# Patient Record
Sex: MALE | Race: WHITE | URBAN - METROPOLITAN AREA
[De-identification: names, ages, dates, MRNs, and addresses within clinical notes are randomized per-mention and may not be internally consistent; named-entity substitution may affect disease eponyms.]

---

## 2023-11-23 ENCOUNTER — HOSPITAL ENCOUNTER (INPATIENT)
Facility: MEDICAL CENTER | Age: 58
LOS: 5 days | DRG: 091 | End: 2023-11-28
Attending: EMERGENCY MEDICINE | Admitting: STUDENT IN AN ORGANIZED HEALTH CARE EDUCATION/TRAINING PROGRAM
Payer: COMMERCIAL

## 2023-11-23 ENCOUNTER — HOSPITAL ENCOUNTER (OUTPATIENT)
Dept: RADIOLOGY | Facility: MEDICAL CENTER | Age: 58
End: 2023-11-23

## 2023-11-23 DIAGNOSIS — G08 CEREBRAL VENOUS THROMBOSIS: Primary | ICD-10-CM

## 2023-11-23 DIAGNOSIS — G08 ACUTE CEREBRAL VENOUS SINUS THROMBOSIS: ICD-10-CM

## 2023-11-23 DIAGNOSIS — R52 PAIN: ICD-10-CM

## 2023-11-23 DIAGNOSIS — G51.0 BELL'S PALSY: ICD-10-CM

## 2023-11-23 LAB
ALBUMIN SERPL BCP-MCNC: 4.1 G/DL (ref 3.2–4.9)
ALBUMIN/GLOB SERPL: 1.2 G/DL
ALP SERPL-CCNC: 85 U/L (ref 30–99)
ALT SERPL-CCNC: 26 U/L (ref 2–50)
ANION GAP SERPL CALC-SCNC: 15 MMOL/L (ref 7–16)
APTT PPP: 67.6 SEC (ref 24.7–36)
AST SERPL-CCNC: 19 U/L (ref 12–45)
BASOPHILS # BLD AUTO: 0.5 % (ref 0–1.8)
BASOPHILS # BLD: 0.05 K/UL (ref 0–0.12)
BILIRUB SERPL-MCNC: 1.2 MG/DL (ref 0.1–1.5)
BUN SERPL-MCNC: 12 MG/DL (ref 8–22)
CALCIUM ALBUM COR SERPL-MCNC: 8.7 MG/DL (ref 8.5–10.5)
CALCIUM SERPL-MCNC: 8.8 MG/DL (ref 8.5–10.5)
CHLORIDE SERPL-SCNC: 98 MMOL/L (ref 96–112)
CO2 SERPL-SCNC: 22 MMOL/L (ref 20–33)
CREAT SERPL-MCNC: 0.8 MG/DL (ref 0.5–1.4)
EOSINOPHIL # BLD AUTO: 0.11 K/UL (ref 0–0.51)
EOSINOPHIL NFR BLD: 1.1 % (ref 0–6.9)
ERYTHROCYTE [DISTWIDTH] IN BLOOD BY AUTOMATED COUNT: 37.3 FL (ref 35.9–50)
GFR SERPLBLD CREATININE-BSD FMLA CKD-EPI: 103 ML/MIN/1.73 M 2
GLOBULIN SER CALC-MCNC: 3.3 G/DL (ref 1.9–3.5)
GLUCOSE SERPL-MCNC: 114 MG/DL (ref 65–99)
HCT VFR BLD AUTO: 49.8 % (ref 42–52)
HGB BLD-MCNC: 18.2 G/DL (ref 14–18)
IMM GRANULOCYTES # BLD AUTO: 0.03 K/UL (ref 0–0.11)
IMM GRANULOCYTES NFR BLD AUTO: 0.3 % (ref 0–0.9)
INR PPP: 1.11 (ref 0.87–1.13)
LYMPHOCYTES # BLD AUTO: 2.05 K/UL (ref 1–4.8)
LYMPHOCYTES NFR BLD: 19.7 % (ref 22–41)
MCH RBC QN AUTO: 31.8 PG (ref 27–33)
MCHC RBC AUTO-ENTMCNC: 36.5 G/DL (ref 32.3–36.5)
MCV RBC AUTO: 87.1 FL (ref 81.4–97.8)
MONOCYTES # BLD AUTO: 1.01 K/UL (ref 0–0.85)
MONOCYTES NFR BLD AUTO: 9.7 % (ref 0–13.4)
NEUTROPHILS # BLD AUTO: 7.16 K/UL (ref 1.82–7.42)
NEUTROPHILS NFR BLD: 68.7 % (ref 44–72)
NRBC # BLD AUTO: 0 K/UL
NRBC BLD-RTO: 0 /100 WBC (ref 0–0.2)
PLATELET # BLD AUTO: 301 K/UL (ref 164–446)
PMV BLD AUTO: 9.1 FL (ref 9–12.9)
POTASSIUM SERPL-SCNC: 3.5 MMOL/L (ref 3.6–5.5)
PROT SERPL-MCNC: 7.4 G/DL (ref 6–8.2)
PROTHROMBIN TIME: 14.4 SEC (ref 12–14.6)
RBC # BLD AUTO: 5.72 M/UL (ref 4.7–6.1)
SODIUM SERPL-SCNC: 135 MMOL/L (ref 135–145)
UFH PPP CHRO-ACNC: 0.36 IU/ML
WBC # BLD AUTO: 10.4 K/UL (ref 4.8–10.8)

## 2023-11-23 PROCEDURE — 80053 COMPREHEN METABOLIC PANEL: CPT

## 2023-11-23 PROCEDURE — 96366 THER/PROPH/DIAG IV INF ADDON: CPT

## 2023-11-23 PROCEDURE — 83036 HEMOGLOBIN GLYCOSYLATED A1C: CPT

## 2023-11-23 PROCEDURE — 85730 THROMBOPLASTIN TIME PARTIAL: CPT

## 2023-11-23 PROCEDURE — 99291 CRITICAL CARE FIRST HOUR: CPT | Mod: GC,AI | Performed by: STUDENT IN AN ORGANIZED HEALTH CARE EDUCATION/TRAINING PROGRAM

## 2023-11-23 PROCEDURE — 85025 COMPLETE CBC W/AUTO DIFF WBC: CPT

## 2023-11-23 PROCEDURE — 99291 CRITICAL CARE FIRST HOUR: CPT

## 2023-11-23 PROCEDURE — 85610 PROTHROMBIN TIME: CPT

## 2023-11-23 PROCEDURE — 80061 LIPID PANEL: CPT

## 2023-11-23 PROCEDURE — 36415 COLL VENOUS BLD VENIPUNCTURE: CPT

## 2023-11-23 PROCEDURE — 99222 1ST HOSP IP/OBS MODERATE 55: CPT | Performed by: STUDENT IN AN ORGANIZED HEALTH CARE EDUCATION/TRAINING PROGRAM

## 2023-11-23 PROCEDURE — 700111 HCHG RX REV CODE 636 W/ 250 OVERRIDE (IP): Performed by: EMERGENCY MEDICINE

## 2023-11-23 PROCEDURE — 83735 ASSAY OF MAGNESIUM: CPT

## 2023-11-23 PROCEDURE — 85520 HEPARIN ASSAY: CPT

## 2023-11-23 PROCEDURE — 96365 THER/PROPH/DIAG IV INF INIT: CPT

## 2023-11-23 PROCEDURE — 770000 HCHG ROOM/CARE - INTERMEDIATE ICU *

## 2023-11-23 RX ORDER — PREDNISONE 20 MG/1
60 TABLET ORAL ONCE
Status: COMPLETED | OUTPATIENT
Start: 2023-11-24 | End: 2023-11-24

## 2023-11-23 RX ORDER — HEPARIN SODIUM 5000 [USP'U]/100ML
0-30 INJECTION, SOLUTION INTRAVENOUS CONTINUOUS
Status: DISCONTINUED | OUTPATIENT
Start: 2023-11-24 | End: 2023-11-24

## 2023-11-23 RX ORDER — HEPARIN SODIUM 1000 [USP'U]/ML
40 INJECTION, SOLUTION INTRAVENOUS; SUBCUTANEOUS PRN
Status: DISCONTINUED | OUTPATIENT
Start: 2023-11-23 | End: 2023-11-24

## 2023-11-23 RX ADMIN — HEPARIN SODIUM 18 UNITS/KG/HR: 5000 INJECTION, SOLUTION INTRAVENOUS at 23:37

## 2023-11-24 ENCOUNTER — APPOINTMENT (OUTPATIENT)
Dept: RADIOLOGY | Facility: MEDICAL CENTER | Age: 58
DRG: 091 | End: 2023-11-24
Attending: STUDENT IN AN ORGANIZED HEALTH CARE EDUCATION/TRAINING PROGRAM
Payer: COMMERCIAL

## 2023-11-24 PROBLEM — E87.6 HYPOKALEMIA: Status: ACTIVE | Noted: 2023-11-24

## 2023-11-24 PROBLEM — R11.2 NAUSEA AND VOMITING: Status: ACTIVE | Noted: 2023-11-24

## 2023-11-24 PROBLEM — G51.0 BELL'S PALSY: Status: ACTIVE | Noted: 2023-11-24

## 2023-11-24 LAB
ALBUMIN SERPL BCP-MCNC: 3.4 G/DL (ref 3.2–4.9)
ALBUMIN/GLOB SERPL: 1.2 G/DL
ALP SERPL-CCNC: 75 U/L (ref 30–99)
ALT SERPL-CCNC: 23 U/L (ref 2–50)
ANION GAP SERPL CALC-SCNC: 12 MMOL/L (ref 7–16)
AST SERPL-CCNC: 16 U/L (ref 12–45)
BILIRUB SERPL-MCNC: 1.1 MG/DL (ref 0.1–1.5)
BUN SERPL-MCNC: 10 MG/DL (ref 8–22)
CALCIUM ALBUM COR SERPL-MCNC: 8.9 MG/DL (ref 8.5–10.5)
CALCIUM SERPL-MCNC: 8.4 MG/DL (ref 8.5–10.5)
CHLORIDE SERPL-SCNC: 96 MMOL/L (ref 96–112)
CHOLEST SERPL-MCNC: 156 MG/DL (ref 100–199)
CO2 SERPL-SCNC: 25 MMOL/L (ref 20–33)
CREAT SERPL-MCNC: 0.67 MG/DL (ref 0.5–1.4)
ERYTHROCYTE [DISTWIDTH] IN BLOOD BY AUTOMATED COUNT: 37.4 FL (ref 35.9–50)
EST. AVERAGE GLUCOSE BLD GHB EST-MCNC: 97 MG/DL
GFR SERPLBLD CREATININE-BSD FMLA CKD-EPI: 109 ML/MIN/1.73 M 2
GLOBULIN SER CALC-MCNC: 2.9 G/DL (ref 1.9–3.5)
GLUCOSE SERPL-MCNC: 133 MG/DL (ref 65–99)
HBA1C MFR BLD: 5 % (ref 4–5.6)
HCT VFR BLD AUTO: 43.9 % (ref 42–52)
HDLC SERPL-MCNC: 42 MG/DL
HGB BLD-MCNC: 16 G/DL (ref 14–18)
LDLC SERPL CALC-MCNC: 102 MG/DL
MAGNESIUM SERPL-MCNC: 2.3 MG/DL (ref 1.5–2.5)
MCH RBC QN AUTO: 32.3 PG (ref 27–33)
MCHC RBC AUTO-ENTMCNC: 36.4 G/DL (ref 32.3–36.5)
MCV RBC AUTO: 88.7 FL (ref 81.4–97.8)
PLATELET # BLD AUTO: 278 K/UL (ref 164–446)
PMV BLD AUTO: 9.4 FL (ref 9–12.9)
POTASSIUM SERPL-SCNC: 3.9 MMOL/L (ref 3.6–5.5)
PROT SERPL-MCNC: 6.3 G/DL (ref 6–8.2)
RBC # BLD AUTO: 4.95 M/UL (ref 4.7–6.1)
SODIUM SERPL-SCNC: 133 MMOL/L (ref 135–145)
TRIGL SERPL-MCNC: 59 MG/DL (ref 0–149)
UFH PPP CHRO-ACNC: 0.42 IU/ML
UFH PPP CHRO-ACNC: 0.43 IU/ML
UFH PPP CHRO-ACNC: >1.1 IU/ML
WBC # BLD AUTO: 8.4 K/UL (ref 4.8–10.8)

## 2023-11-24 PROCEDURE — 97535 SELF CARE MNGMENT TRAINING: CPT

## 2023-11-24 PROCEDURE — 80053 COMPREHEN METABOLIC PANEL: CPT

## 2023-11-24 PROCEDURE — 99233 SBSQ HOSP IP/OBS HIGH 50: CPT | Performed by: HOSPITALIST

## 2023-11-24 PROCEDURE — 770000 HCHG ROOM/CARE - INTERMEDIATE ICU *

## 2023-11-24 PROCEDURE — 70544 MR ANGIOGRAPHY HEAD W/O DYE: CPT

## 2023-11-24 PROCEDURE — 99232 SBSQ HOSP IP/OBS MODERATE 35: CPT | Performed by: STUDENT IN AN ORGANIZED HEALTH CARE EDUCATION/TRAINING PROGRAM

## 2023-11-24 PROCEDURE — 700111 HCHG RX REV CODE 636 W/ 250 OVERRIDE (IP): Mod: JZ | Performed by: STUDENT IN AN ORGANIZED HEALTH CARE EDUCATION/TRAINING PROGRAM

## 2023-11-24 PROCEDURE — 700111 HCHG RX REV CODE 636 W/ 250 OVERRIDE (IP): Performed by: HOSPITALIST

## 2023-11-24 PROCEDURE — 70553 MRI BRAIN STEM W/O & W/DYE: CPT

## 2023-11-24 PROCEDURE — 700117 HCHG RX CONTRAST REV CODE 255: Performed by: STUDENT IN AN ORGANIZED HEALTH CARE EDUCATION/TRAINING PROGRAM

## 2023-11-24 PROCEDURE — A9270 NON-COVERED ITEM OR SERVICE: HCPCS | Mod: JZ | Performed by: HOSPITALIST

## 2023-11-24 PROCEDURE — 700111 HCHG RX REV CODE 636 W/ 250 OVERRIDE (IP): Performed by: INTERNAL MEDICINE

## 2023-11-24 PROCEDURE — A9270 NON-COVERED ITEM OR SERVICE: HCPCS | Mod: JZ | Performed by: STUDENT IN AN ORGANIZED HEALTH CARE EDUCATION/TRAINING PROGRAM

## 2023-11-24 PROCEDURE — A9579 GAD-BASE MR CONTRAST NOS,1ML: HCPCS | Performed by: STUDENT IN AN ORGANIZED HEALTH CARE EDUCATION/TRAINING PROGRAM

## 2023-11-24 PROCEDURE — 700102 HCHG RX REV CODE 250 W/ 637 OVERRIDE(OP): Mod: JZ | Performed by: STUDENT IN AN ORGANIZED HEALTH CARE EDUCATION/TRAINING PROGRAM

## 2023-11-24 PROCEDURE — 85520 HEPARIN ASSAY: CPT

## 2023-11-24 PROCEDURE — 700111 HCHG RX REV CODE 636 W/ 250 OVERRIDE (IP): Performed by: EMERGENCY MEDICINE

## 2023-11-24 PROCEDURE — 85027 COMPLETE CBC AUTOMATED: CPT

## 2023-11-24 PROCEDURE — 700105 HCHG RX REV CODE 258: Performed by: STUDENT IN AN ORGANIZED HEALTH CARE EDUCATION/TRAINING PROGRAM

## 2023-11-24 PROCEDURE — 700105 HCHG RX REV CODE 258: Performed by: HOSPITALIST

## 2023-11-24 PROCEDURE — 700102 HCHG RX REV CODE 250 W/ 637 OVERRIDE(OP): Mod: JZ | Performed by: HOSPITALIST

## 2023-11-24 RX ORDER — MORPHINE SULFATE 4 MG/ML
4 INJECTION INTRAVENOUS EVERY 4 HOURS PRN
Status: DISCONTINUED | OUTPATIENT
Start: 2023-11-24 | End: 2023-11-28 | Stop reason: HOSPADM

## 2023-11-24 RX ORDER — HEPARIN SODIUM 5000 [USP'U]/100ML
0-30 INJECTION, SOLUTION INTRAVENOUS CONTINUOUS
Status: DISCONTINUED | OUTPATIENT
Start: 2023-11-24 | End: 2023-11-24

## 2023-11-24 RX ORDER — LABETALOL HYDROCHLORIDE 5 MG/ML
10 INJECTION, SOLUTION INTRAVENOUS
Status: COMPLETED | OUTPATIENT
Start: 2023-11-24 | End: 2023-11-24

## 2023-11-24 RX ORDER — HEPARIN SODIUM 1000 [USP'U]/ML
40 INJECTION, SOLUTION INTRAVENOUS; SUBCUTANEOUS PRN
Status: DISCONTINUED | OUTPATIENT
Start: 2023-11-24 | End: 2023-11-24

## 2023-11-24 RX ORDER — AMLODIPINE BESYLATE 10 MG/1
5 TABLET ORAL
Status: DISCONTINUED | OUTPATIENT
Start: 2023-11-24 | End: 2023-11-25

## 2023-11-24 RX ORDER — LABETALOL HYDROCHLORIDE 5 MG/ML
10 INJECTION, SOLUTION INTRAVENOUS
Status: DISCONTINUED | OUTPATIENT
Start: 2023-11-24 | End: 2023-11-28 | Stop reason: HOSPADM

## 2023-11-24 RX ORDER — ACETAMINOPHEN 325 MG/1
650 TABLET ORAL EVERY 6 HOURS PRN
Status: DISCONTINUED | OUTPATIENT
Start: 2023-11-24 | End: 2023-11-28 | Stop reason: HOSPADM

## 2023-11-24 RX ORDER — POTASSIUM CHLORIDE 20 MEQ/1
20 TABLET, EXTENDED RELEASE ORAL ONCE
Status: COMPLETED | OUTPATIENT
Start: 2023-11-24 | End: 2023-11-24

## 2023-11-24 RX ORDER — OXYCODONE HYDROCHLORIDE 5 MG/1
5 TABLET ORAL EVERY 4 HOURS PRN
Status: DISCONTINUED | OUTPATIENT
Start: 2023-11-24 | End: 2023-11-28 | Stop reason: HOSPADM

## 2023-11-24 RX ORDER — HEPARIN SODIUM 5000 [USP'U]/100ML
0-30 INJECTION, SOLUTION INTRAVENOUS CONTINUOUS
Status: DISCONTINUED | OUTPATIENT
Start: 2023-11-24 | End: 2023-11-27

## 2023-11-24 RX ORDER — LABETALOL HYDROCHLORIDE 5 MG/ML
10 INJECTION, SOLUTION INTRAVENOUS
Status: DISCONTINUED | OUTPATIENT
Start: 2023-11-24 | End: 2023-11-24

## 2023-11-24 RX ORDER — ONDANSETRON 2 MG/ML
4 INJECTION INTRAMUSCULAR; INTRAVENOUS EVERY 4 HOURS PRN
Status: DISCONTINUED | OUTPATIENT
Start: 2023-11-24 | End: 2023-11-28 | Stop reason: HOSPADM

## 2023-11-24 RX ORDER — SODIUM CHLORIDE 9 MG/ML
500 INJECTION, SOLUTION INTRAVENOUS
Status: DISCONTINUED | OUTPATIENT
Start: 2023-11-24 | End: 2023-11-28 | Stop reason: HOSPADM

## 2023-11-24 RX ORDER — BISACODYL 10 MG
10 SUPPOSITORY, RECTAL RECTAL
Status: DISCONTINUED | OUTPATIENT
Start: 2023-11-24 | End: 2023-11-28 | Stop reason: HOSPADM

## 2023-11-24 RX ORDER — POTASSIUM CHLORIDE 20 MEQ/1
40 TABLET, EXTENDED RELEASE ORAL ONCE
Status: COMPLETED | OUTPATIENT
Start: 2023-11-24 | End: 2023-11-24

## 2023-11-24 RX ORDER — HYDRALAZINE HYDROCHLORIDE 20 MG/ML
10 INJECTION INTRAMUSCULAR; INTRAVENOUS
Status: DISCONTINUED | OUTPATIENT
Start: 2023-11-24 | End: 2023-11-24

## 2023-11-24 RX ORDER — CARBOXYMETHYLCELLULOSE SODIUM 5 MG/ML
1 SOLUTION/ DROPS OPHTHALMIC 2 TIMES DAILY PRN
Status: DISCONTINUED | OUTPATIENT
Start: 2023-11-24 | End: 2023-11-28 | Stop reason: HOSPADM

## 2023-11-24 RX ORDER — AMOXICILLIN 250 MG
2 CAPSULE ORAL 2 TIMES DAILY
Status: DISCONTINUED | OUTPATIENT
Start: 2023-11-24 | End: 2023-11-28 | Stop reason: HOSPADM

## 2023-11-24 RX ORDER — SODIUM CHLORIDE, SODIUM LACTATE, POTASSIUM CHLORIDE, CALCIUM CHLORIDE 600; 310; 30; 20 MG/100ML; MG/100ML; MG/100ML; MG/100ML
INJECTION, SOLUTION INTRAVENOUS CONTINUOUS
Status: DISCONTINUED | OUTPATIENT
Start: 2023-11-24 | End: 2023-11-24

## 2023-11-24 RX ORDER — HYDRALAZINE HYDROCHLORIDE 20 MG/ML
10 INJECTION INTRAMUSCULAR; INTRAVENOUS
Status: COMPLETED | OUTPATIENT
Start: 2023-11-24 | End: 2023-11-24

## 2023-11-24 RX ORDER — ATORVASTATIN CALCIUM 80 MG/1
80 TABLET, FILM COATED ORAL EVERY EVENING
Status: DISCONTINUED | OUTPATIENT
Start: 2023-11-24 | End: 2023-11-28 | Stop reason: HOSPADM

## 2023-11-24 RX ORDER — HEPARIN SODIUM 1000 [USP'U]/ML
40 INJECTION, SOLUTION INTRAVENOUS; SUBCUTANEOUS PRN
Status: DISCONTINUED | OUTPATIENT
Start: 2023-11-24 | End: 2023-11-27

## 2023-11-24 RX ORDER — CARBOXYMETHYLCELLULOSE SODIUM 5 MG/ML
1 SOLUTION/ DROPS OPHTHALMIC 2 TIMES DAILY
Status: DISCONTINUED | OUTPATIENT
Start: 2023-11-25 | End: 2023-11-27

## 2023-11-24 RX ORDER — POLYETHYLENE GLYCOL 3350 17 G/17G
1 POWDER, FOR SOLUTION ORAL
Status: DISCONTINUED | OUTPATIENT
Start: 2023-11-24 | End: 2023-11-28 | Stop reason: HOSPADM

## 2023-11-24 RX ORDER — VALACYCLOVIR HYDROCHLORIDE 500 MG/1
1000 TABLET, FILM COATED ORAL 3 TIMES DAILY
Status: DISCONTINUED | OUTPATIENT
Start: 2023-11-24 | End: 2023-11-28 | Stop reason: HOSPADM

## 2023-11-24 RX ORDER — LORAZEPAM 2 MG/ML
0.5 INJECTION INTRAMUSCULAR ONCE
Status: COMPLETED | OUTPATIENT
Start: 2023-11-24 | End: 2023-11-24

## 2023-11-24 RX ADMIN — OXYCODONE 5 MG: 5 TABLET ORAL at 18:05

## 2023-11-24 RX ADMIN — SENNOSIDES AND DOCUSATE SODIUM 2 TABLET: 50; 8.6 TABLET ORAL at 05:40

## 2023-11-24 RX ADMIN — MORPHINE SULFATE 4 MG: 4 INJECTION, SOLUTION INTRAMUSCULAR; INTRAVENOUS at 06:06

## 2023-11-24 RX ADMIN — SENNOSIDES AND DOCUSATE SODIUM 2 TABLET: 50; 8.6 TABLET ORAL at 17:09

## 2023-11-24 RX ADMIN — POTASSIUM CHLORIDE 20 MEQ: 1500 TABLET, EXTENDED RELEASE ORAL at 13:27

## 2023-11-24 RX ADMIN — POTASSIUM CHLORIDE 40 MEQ: 1500 TABLET, EXTENDED RELEASE ORAL at 01:53

## 2023-11-24 RX ADMIN — SODIUM CHLORIDE, POTASSIUM CHLORIDE, SODIUM LACTATE AND CALCIUM CHLORIDE: 600; 310; 30; 20 INJECTION, SOLUTION INTRAVENOUS at 01:46

## 2023-11-24 RX ADMIN — SODIUM CHLORIDE, POTASSIUM CHLORIDE, SODIUM LACTATE AND CALCIUM CHLORIDE: 600; 310; 30; 20 INJECTION, SOLUTION INTRAVENOUS at 14:05

## 2023-11-24 RX ADMIN — VALACYCLOVIR HYDROCHLORIDE 1000 MG: 500 TABLET, FILM COATED ORAL at 17:09

## 2023-11-24 RX ADMIN — LABETALOL HYDROCHLORIDE 10 MG: 5 INJECTION, SOLUTION INTRAVENOUS at 02:14

## 2023-11-24 RX ADMIN — AMLODIPINE BESYLATE 5 MG: 5 TABLET ORAL at 15:34

## 2023-11-24 RX ADMIN — ACETAMINOPHEN 650 MG: 325 TABLET, FILM COATED ORAL at 17:09

## 2023-11-24 RX ADMIN — HYDRALAZINE HYDROCHLORIDE 10 MG: 20 INJECTION, SOLUTION INTRAMUSCULAR; INTRAVENOUS at 17:13

## 2023-11-24 RX ADMIN — HEPARIN SODIUM 15 UNITS/KG/HR: 5000 INJECTION, SOLUTION INTRAVENOUS at 13:23

## 2023-11-24 RX ADMIN — LORAZEPAM 0.5 MG: 2 INJECTION, SOLUTION INTRAMUSCULAR; INTRAVENOUS at 11:59

## 2023-11-24 RX ADMIN — PREDNISONE 60 MG: 50 TABLET ORAL at 13:28

## 2023-11-24 RX ADMIN — OXYCODONE 5 MG: 5 TABLET ORAL at 14:13

## 2023-11-24 RX ADMIN — CARBOXYMETHYLCELLULOSE SODIUM 1 DROP: 5 SOLUTION/ DROPS OPHTHALMIC at 02:14

## 2023-11-24 RX ADMIN — SODIUM CHLORIDE, POTASSIUM CHLORIDE, SODIUM LACTATE AND CALCIUM CHLORIDE: 600; 310; 30; 20 INJECTION, SOLUTION INTRAVENOUS at 13:20

## 2023-11-24 RX ADMIN — LABETALOL HYDROCHLORIDE 10 MG: 5 INJECTION, SOLUTION INTRAVENOUS at 03:17

## 2023-11-24 RX ADMIN — PREDNISONE 60 MG: 20 TABLET ORAL at 00:14

## 2023-11-24 RX ADMIN — MINERAL OIL, PETROLATUM 1 APPLICATION: 425; 568 OINTMENT OPHTHALMIC at 21:15

## 2023-11-24 RX ADMIN — MORPHINE SULFATE 4 MG: 4 INJECTION, SOLUTION INTRAMUSCULAR; INTRAVENOUS at 09:37

## 2023-11-24 RX ADMIN — VALACYCLOVIR HYDROCHLORIDE 1000 MG: 500 TABLET, FILM COATED ORAL at 03:09

## 2023-11-24 RX ADMIN — VALACYCLOVIR HYDROCHLORIDE 1000 MG: 500 TABLET, FILM COATED ORAL at 13:28

## 2023-11-24 RX ADMIN — ACETAMINOPHEN 650 MG: 325 TABLET, FILM COATED ORAL at 02:24

## 2023-11-24 RX ADMIN — GADOTERIDOL 15 ML: 279.3 INJECTION, SOLUTION INTRAVENOUS at 13:00

## 2023-11-24 RX ADMIN — ACETAMINOPHEN 650 MG: 325 TABLET, FILM COATED ORAL at 08:39

## 2023-11-24 RX ADMIN — POLYETHYLENE GLYCOL 3350 1 PACKET: 17 POWDER, FOR SOLUTION ORAL at 17:09

## 2023-11-24 RX ADMIN — LABETALOL HYDROCHLORIDE 10 MG: 5 INJECTION, SOLUTION INTRAVENOUS at 20:44

## 2023-11-24 RX ADMIN — ACETAMINOPHEN 650 MG: 325 TABLET, FILM COATED ORAL at 23:13

## 2023-11-24 RX ADMIN — LABETALOL HYDROCHLORIDE 10 MG: 5 INJECTION, SOLUTION INTRAVENOUS at 23:32

## 2023-11-24 RX ADMIN — HYDRALAZINE HYDROCHLORIDE 10 MG: 20 INJECTION, SOLUTION INTRAMUSCULAR; INTRAVENOUS at 08:40

## 2023-11-24 RX ADMIN — ATORVASTATIN CALCIUM 80 MG: 80 TABLET, FILM COATED ORAL at 17:09

## 2023-11-24 RX ADMIN — HEPARIN SODIUM 15 UNITS/KG/HR: 5000 INJECTION, SOLUTION INTRAVENOUS at 18:41

## 2023-11-24 ASSESSMENT — FIBROSIS 4 INDEX
FIB4 SCORE: 0.69

## 2023-11-24 ASSESSMENT — PAIN DESCRIPTION - PAIN TYPE
TYPE: ACUTE PAIN

## 2023-11-24 ASSESSMENT — ENCOUNTER SYMPTOMS
FEVER: 0
HEADACHES: 1
BLURRED VISION: 0
FOCAL WEAKNESS: 0

## 2023-11-24 ASSESSMENT — ACTIVITIES OF DAILY LIVING (ADL): TOILETING: INDEPENDENT

## 2023-11-24 ASSESSMENT — COPD QUESTIONNAIRES
COPD SCREENING SCORE: 1
DURING THE PAST 4 WEEKS HOW MUCH DID YOU FEEL SHORT OF BREATH: NONE/LITTLE OF THE TIME
DO YOU EVER COUGH UP ANY MUCUS OR PHLEGM?: NO/ONLY WITH OCCASIONAL COLDS OR INFECTIONS
HAVE YOU SMOKED AT LEAST 100 CIGARETTES IN YOUR ENTIRE LIFE: NO/DON'T KNOW

## 2023-11-24 ASSESSMENT — COGNITIVE AND FUNCTIONAL STATUS - GENERAL
DAILY ACTIVITIY SCORE: 24
MOBILITY SCORE: 24
SUGGESTED CMS G CODE MODIFIER DAILY ACTIVITY: CH
SUGGESTED CMS G CODE MODIFIER DAILY ACTIVITY: CH
DAILY ACTIVITIY SCORE: 24
SUGGESTED CMS G CODE MODIFIER MOBILITY: CH
SUGGESTED CMS G CODE MODIFIER MOBILITY: CH
MOBILITY SCORE: 24

## 2023-11-24 ASSESSMENT — PATIENT HEALTH QUESTIONNAIRE - PHQ9
1. LITTLE INTEREST OR PLEASURE IN DOING THINGS: NOT AT ALL
2. FEELING DOWN, DEPRESSED, IRRITABLE, OR HOPELESS: NOT AT ALL
SUM OF ALL RESPONSES TO PHQ9 QUESTIONS 1 AND 2: 0

## 2023-11-24 NOTE — CONSULTS
RCC    Asked by ERP to evaluate for IMCU admission    58-year-old male with a history of dyslipidemia transferred from the [facility] for extensive dural venous sinus thrombosis noted on CTA.  Patient seen by neurology and patient felt appropriate for IV heparin therapy with every 2 hour neurochecks.  Neurology okay with IMCU admission with patient with low NIH score.  Tight blood pressure goal 100-1 40 range.  Follow-up CT for neuro changes.  MRI brain with contrast venogram in a.m.  Patient also noted to have Bell's palsy and acyclovir being started.  Neurology considering steroids as well.  No significant hemodynamic or respiratory issues at this time.    This patient will be admitted to the IMCU for dural venous sinus thrombosis and remains under the care of neurology and hospitalist team (who all questions and concerns should be relayed to).  While a critical care consultation has not been requested, we are immediately available if the patient requires critical care in the future.

## 2023-11-24 NOTE — HOSPITAL COURSE
58-year-old male with history of dyslipidemia presented on 11/23/2023 with 1 week history of headache for which she was evaluated at outside hospital diagnosed with a viral syndrome and discharged.  The patient returned with worsening headache associated with nausea vomiting and right-sided facial droop.  CTA demonstrated extensive dural venous sinus thrombosis and the patient was transferred to our facility for higher level of care.

## 2023-11-24 NOTE — ASSESSMENT & PLAN NOTE
Patient with 1 week history of headaches, worsening with nausea and vomiting, CTA at outside hospital demonstrating cerebral venous thrombosis,     Neurology following  Transition heparin drip to Eliquis 5 twice daily for 6-month  Neurochecks every 4 hours  Continue close clinical monitoring  Reviewed with patient need for outpatient   ollow-up with neurology and hematology and defer hypercoagulable workup to outpatient setting since he was on heparin drip    Patient reports being up-to-date on routine cancer screening.

## 2023-11-24 NOTE — CONSULTS
Neurology Initial Consult H&P  Neurohospitalist Service, [facility]    Referring Physician: No att. providers found    Chief Complaint   Patient presents with    Possible Stroke     PT is tx from [place], PT noted to have full venous occlusion while at outside facility. PT reports HA x 1 week prior. Only neuro deficit at this time is slight left sided facial droop.        HPI: Khalif Wilson is a 58 y.o. male with history of hyperlipidemia who presented as a transfer from [facility] after finding of  extensive dural venous sinus thrombosis on outside CTA.  Patient reports first developing a headache last week.  It was of mild to moderate intensity and occipitally based.  Subsequently patient developed nausea and vomiting and then went to the hospital for evaluation.  At outside facility patient was treated for a presumed viral etiology for headache nausea and vomiting.  When his symptoms failed to octavio patient returned to hospital for evaluation.  At that time patient received CT head with normal findings and was once more discharged with symptomatic management.  Patient continued to have mild headaches but was using pain medication and antinausea medication to control symptoms.  The day of admission patient had run out of medication and it began to develop worsening headache once more.  At around dinnertime this evening roughly 7:00 patient developed drooling from the right side of his mouth.  Patient then also noticed weakness of eyelid closure on the right.  At this time he wants more presented to [facility] for evaluation.  At which time subsequent imaging demonstrated extensive dural venous sinus thrombosis.  [facility] contacted patient case discussed with ED physician and initiated appropriate medical management measures.  Patient was given a bolus of heparin and began on heparin drip and arrange for transfer to this hospital for  higher level of care.    Review of systems: In addition to what is detailed in the HPI above, all other systems reviewed and are negative.    Past Medical History:    has no past medical history on file.    FHx:  family history is not on file.    SHx:       Allergies:  No Known Allergies    Medications:  No current facility-administered medications for this encounter.  No current outpatient medications on file.      NEUROLOGICAL EXAM:     MENTAL STATUS: Awake, alert and oriented to person, place, time and situation, attention and memory intact  LANG/SPEECH: Naming and repetition intact, fluent speech, follows simple, two-step, multi-step and complex commands.    CRANIAL NERVES:  II: Pupils equal and reactive, no VF deficits  III, IV, VI: EOM intact, no gaze preference or deviation, no nystagmus.  V: normal sensation in V1, V2, and V3 segments bilaterally  VII: Right facial asymmetry including right frontalis weakness right orbicularis oculi weakness subtle nasolabial fold loss and asymmetric smile with right facial droop.  VIII: normal hearing to speech  IX, X: normal palatal elevation, no uvular deviation  XI: 5/5 head turn and 5/5 shoulder shrug bilaterally  XII: midline tongue protrusion    MOTOR: Antigravity movement in bilateral upper and lower extremities. Full and symmetric power in proximal and distal muscle groups in bilateral upper and lower extremities    REFLEXES: 1-2/4 throughout, bilateral flexor plantar response, no clonus  SENSORY: Normal to fine touch in all extremities, no extinction to double sided stimulation (visual & tactile)  COORD: Normal finger to nose, no tremor, no dysmetria  GAIT: Deferred      NIHSS: National Institutes of Health Stroke Scale    [0] 1a:Level of Consciousness    0-alert 1-drowsy   2-stupor   3-coma  [0] 1b:LOC Questions                  0-both  1-one      2-neither  [0] 1c:LOC Commands                   0-both  1-one      2-neither  [0] 2: Best Gaze                     " 0-nl    1-partial  2-forced  [0] 3: Visual Fields                   0-nl    1-partial  2-complete 3-bilat  [2] 4: Facial Paresis                0-nl    1-minor    2-partial  3-full  MOTOR                       0-nl  [0] 5: Right Arm           1-drift  [0] 6: Left Arm             2-some effort vs gravity  [0] 7: Right Leg           3-no effort vs gravity  [0] 8: Left Leg             4-no movement                             x-untestable  [0] 9: Limb Ataxia                    0-abs   1-1_limb   2-2+_limbs       x-untestable  [0] 10:Sensory                        0-nl    1-partial  2-dense  [0] 11:Best Language/Aphasia         0-nl    1-mild/mod 2-severe   3-mute  [0] 12:Dysarthria                     0-nl    1-mild/mod 2-severe       x-untestable  [0] 13:Neglect/Inattention            0-none  1-partial  2-complete  [2] TOTAL      Objective Data:    Labs:  No results found for: \"PROTHROMBTM\", \"INR\"   No results found for: \"WBC\", \"RBC\", \"HEMOGLOBIN\", \"HEMATOCRIT\", \"MCV\", \"MCH\", \"MCHC\", \"MPV\", \"NEUTSPOLYS\", \"LYMPHOCYTES\", \"MONOCYTES\", \"EOSINOPHILS\", \"BASOPHILS\", \"HYPOCHROMIA\", \"ANISOCYTOSIS\"   No results found for: \"SODIUM\", \"POTASSIUM\", \"CHLORIDE\", \"CO2\", \"GLUCOSE\", \"BUN\", \"CREATININE\", \"BUNCREATRAT\", \"GLOMRATE\"   No results found for: \"CHOLSTRLTOT\", \"LDL\", \"HDL\", \"TRIGLYCERIDE\"    No results found for: \"ALKPHOSPHAT\", \"ASTSGOT\", \"ALTSGPT\", \"TBILIRUBIN\"     Imaging/Testing:    I interpreted and/or reviewed the patient's neuroimaging    No orders to display       Assessment and Plan:  Khalif Wilson is a 58 y.o. male with history of hyperlipidemia who presented as a transfer from [facility] after finding of  extensive dural venous sinus thrombosis on outside CTA.  Patient begun on heparin drip and transferred to [facility] for higher level of care.  On physical exam patient demonstrates right lower motor neuron weakness pattern consistent with Bell's palsy.  Based on patient's pattern of " weakness I have low suspicion for an acute ischemic infarct however follow-up MRI will clarify.  It is possible that a viral etiology post explains patient's dural venous sinus thrombosis secondary to acute inflammation and his acute facial nerve weakness.    Problem list:  -- Dural venous sinus thrombosis      Plan:  -- Admit to [facility] w/ q2 neurochecks  -- Systolic blood pressure goal should be normotension, -140  -- Continue heparin gtt DVT protocol  -- STAT CTH for any acute neurologic changes  -- Will decide on appropriate hypercoaguable work up this admission  -- Please obtain MRI brain w and w/o contrast and MRI venogram  -- Patient will be on therapeutic heparin gtt; no need for further mechanical or chemo DVT ppx  -- Start Acyclovir for Bell's palsy, will review relevant literature on safety of steroids in setting of DVST, may decide on steroid dose this admission, eye patch and eye drops recommended to avoid excessive drying of eye w/ potential for injury and scarring  -- PT/OT/SLP    Herman Aquino III, MD  Vascular Neurology, [facility]

## 2023-11-24 NOTE — CARE PLAN
The patient is Watcher - Medium risk of patient condition declining or worsening    Shift Goals  Clinical Goals: blood pressure <140, stable neuro assessment  Patient Goals: rest  Family Goals: updates    Progress made toward(s) clinical / shift goals:    Problem: Pain - Standard  Goal: Alleviation of pain or a reduction in pain to the patient’s comfort goal  Outcome: Progressing     Problem: Neuro Status  Goal: Neuro status will remain stable or improve  Outcome: Progressing       Patient is not progressing towards the following goals: N/A

## 2023-11-24 NOTE — ED TRIAGE NOTES
"Chief Complaint   Patient presents with    Possible Stroke     PT is tx from [place], PT noted to have full venous occlusion while at outside facility. PT reports HA x 1 week prior. Only neuro deficit at this time is slight left sided facial droop.        BIB EMS to red 7, pt on monitor, provided call bell and in gown, labs drawn and sent.    Medications given en route: Heparin drip continued    /84   Pulse 78   Temp 37.1 °C (98.7 °F) (Temporal)   Resp 18   Ht 1.727 m (5' 8\")   Wt 86.2 kg (190 lb)   SpO2 95%   BMI 28.89 kg/m²     "

## 2023-11-24 NOTE — PROGRESS NOTES
Neurology Progress Note  Neurohospitalist Service, [facility]    Referring Physician: Mark Garcia M.D.      Interval History: Patient seen and examined this morning.  Patient doing well without new acute concerns or complaints.  No reported acute overnight events.    Review of systems: In addition to what is detailed in the HPI and/or updated in the interval history, all other systems reviewed and are negative.    Past Medical History, Past Surgical History and Social History reviewed and unchanged from prior    Medications:    Current Facility-Administered Medications:     senna-docusate (Pericolace Or Senokot S) 8.6-50 MG per tablet 2 Tablet, 2 Tablet, Oral, BID, 2 Tablet at 11/24/23 0540 **AND** polyethylene glycol/lytes (Miralax) PACKET 1 Packet, 1 Packet, Oral, QDAY PRN **AND** magnesium hydroxide (Milk Of Magnesia) suspension 30 mL, 30 mL, Oral, QDAY PRN **AND** bisacodyl (Dulcolax) suppository 10 mg, 10 mg, Rectal, QDAY PRN, Yadiel Kumar M.D.    Respiratory Therapy Consult, , Nebulization, Continuous RT, Yadiel Kumar M.D.    acetaminophen (Tylenol) tablet 650 mg, 650 mg, Oral, Q6HRS PRN, Yadiel Kumar M.D., 650 mg at 11/24/23 0839    atorvastatin (Lipitor) tablet 80 mg, 80 mg, Oral, Q EVENING, Yadiel Kumar M.D.    Blood pressure control per admin instructions, , Other, PHARMACY TO DOSE, Yadiel Kumar M.D.    NS (Bolus) 0.9 % infusion 500 mL, 500 mL, Intravenous, Once PRN, Yadiel Kumar M.D.    ondansetron (Zofran) syringe/vial injection 4 mg, 4 mg, Intravenous, Q4HRS PRN, Yadiel Kumar M.D.    hydrALAZINE (Apresoline) injection 10 mg, 10 mg, Intravenous, Q2HRS PRN, Yadiel Kumar M.D., 10 mg at 11/24/23 0840    morphine 4 MG/ML injection 4 mg, 4 mg, Intravenous, Q4HRS PRN, Yadiel Kumar M.D., 4 mg at 11/24/23 0937    carboxymethylcellulose (Refresh Tears) 0.5 % ophthalmic drops 1 Drop, 1 Drop, Both Eyes, BID PRN, Yadiel Kumar M.D., 1  "Drop at 11/24/23 0214    valACYclovir (Valtrex) caplet 1,000 mg, 1,000 mg, Oral, TID, Yadiel Kumar M.D., 1,000 mg at 11/24/23 0309    oxyCODONE immediate-release (Roxicodone) tablet 5 mg, 5 mg, Oral, Q4HRS PRN, Mark Garcia M.D.    LORazepam (Ativan) injection 0.5 mg, 0.5 mg, Intravenous, Once, Mark Garcia M.D.    lactated ringers infusion, , Intravenous, Continuous, Mark Garcia M.D.    heparin infusion 25,000 units in 500 mL 0.45% NACL, 0-30 Units/kg/hr (Order-Specific), Intravenous, Continuous, Mark Garcia M.D.    heparin injection 3,400 Units, 40 Units/kg (Order-Specific), Intravenous, PRN, Mark Garcia M.D.    Physical Examination:   /71   Pulse 66   Temp 36.9 °C (98.5 °F) (Temporal)   Resp (!) 21   Ht 1.727 m (5' 8\")   Wt 83.6 kg (184 lb 4.9 oz)   SpO2 95%   BMI 28.02 kg/m²     NEUROLOGICAL EXAM:     MENTAL STATUS: Awake, alert and oriented to person, place, time and situation, attention and memory intact  LANG/SPEECH: Naming and repetition intact, fluent speech, follows simple, two-step, multi-step and complex commands.     CRANIAL NERVES:  II: Pupils equal and reactive, no VF deficits  III, IV, VI: EOM intact, no gaze preference or deviation, no nystagmus.  V: normal sensation in V1, V2, and V3 segments bilaterally  VII: Right facial asymmetry including right frontalis weakness right orbicularis oculi weakness subtle nasolabial fold loss and asymmetric smile with right facial droop.  VIII: normal hearing to speech  IX, X: normal palatal elevation, no uvular deviation  XI: 5/5 head turn and 5/5 shoulder shrug bilaterally  XII: midline tongue protrusion     MOTOR: Antigravity movement in bilateral upper and lower extremities. Full and symmetric power in proximal and distal muscle groups in bilateral upper and lower extremities     REFLEXES: bilateral flexor plantar response, no clonus  SENSORY: Normal to fine touch in all extremities  COORD: Normal " finger to nose, no tremor, no dysmetria  GAIT: Deferred    Objective Data:    Labs:  Lab Results   Component Value Date/Time    PROTHROMBTM 14.4 11/23/2023 10:41 PM    INR 1.11 11/23/2023 10:41 PM      Lab Results   Component Value Date/Time    WBC 8.4 11/24/2023 05:47 AM    RBC 4.95 11/24/2023 05:47 AM    HEMOGLOBIN 16.0 11/24/2023 05:47 AM    HEMATOCRIT 43.9 11/24/2023 05:47 AM    MCV 88.7 11/24/2023 05:47 AM    MCH 32.3 11/24/2023 05:47 AM    MCHC 36.4 11/24/2023 05:47 AM    MPV 9.4 11/24/2023 05:47 AM    NEUTSPOLYS 68.70 11/23/2023 10:41 PM    LYMPHOCYTES 19.70 (L) 11/23/2023 10:41 PM    MONOCYTES 9.70 11/23/2023 10:41 PM    EOSINOPHILS 1.10 11/23/2023 10:41 PM    BASOPHILS 0.50 11/23/2023 10:41 PM      Lab Results   Component Value Date/Time    SODIUM 133 (L) 11/24/2023 05:47 AM    POTASSIUM 3.9 11/24/2023 05:47 AM    CHLORIDE 96 11/24/2023 05:47 AM    CO2 25 11/24/2023 05:47 AM    GLUCOSE 133 (H) 11/24/2023 05:47 AM    BUN 10 11/24/2023 05:47 AM    CREATININE 0.67 11/24/2023 05:47 AM      Lab Results   Component Value Date/Time    CHOLSTRLTOT 156 11/23/2023 10:41 PM     (H) 11/23/2023 10:41 PM    HDL 42 11/23/2023 10:41 PM    TRIGLYCERIDE 59 11/23/2023 10:41 PM       Lab Results   Component Value Date/Time    ALKPHOSPHAT 75 11/24/2023 05:47 AM    ASTSGOT 16 11/24/2023 05:47 AM    ALTSGPT 23 11/24/2023 05:47 AM    TBILIRUBIN 1.1 11/24/2023 05:47 AM        Imaging/Testing:    I interpreted and/or reviewed the patient's neuroimaging    MR-BRAIN-WITH & W/O    (Results Pending)   MR-VENOGRAM (MRV) HEAD    (Results Pending)       Assessment and Plan:  Khalif Wilson is a 58 y.o. male with history of hyperlipidemia who presented as a transfer from [facility] after finding of  extensive dural venous sinus thrombosis on outside CTA.  Patient begun on heparin drip and transferred to [facility] for higher level of care.  On physical exam patient demonstrates right lower motor  neuron weakness pattern consistent with Bell's palsy.  Based on patient's pattern of weakness I have low suspicion for an acute ischemic infarct however follow-up MRI will clarify.  It is possible that a viral etiology post explains patient's dural venous sinus thrombosis secondary to acute inflammation and his acute facial nerve weakness.  Have started treatment for Bell's palsy.  Pending further imaging will decide on duration of IV anticoagulation and transition to oral anticoagulation treatment.     Problem list:  -- Dural venous sinus thrombosis        Plan:  -- Admit to [facility] w/ q2 neurochecks  -- Systolic blood pressure goal should be normotension, -140  -- Continue heparin gtt DVT protocol  -- STAT CTH for any acute neurologic changes  -- Will decide on appropriate hypercoaguable work up this admission  -- Once transitioned to oral anticoagulation patient will be treated for 3 to 6 months and have follow-up as an outpatient basis for repeat vascular imaging  -- Please obtain MRI brain w and w/o contrast and MRI venogram  -- Patient will be on therapeutic heparin gtt; no need for further mechanical or chemo DVT ppx  -- Valtrex started for Rx of Bell's palsy, would add Prednisone 60 daily x7 days  -- PT/OT/SLP    I have performed a physical exam and reviewed and updated ROS and Plan today (11/24/2023).     Herman Aquino III, MD  Vascular Neurology, [facility]

## 2023-11-24 NOTE — PROGRESS NOTES
Heparin moved from Prince pump to MRI pump. Infusing at 15 units/kg/hr. Wt is 86.2kg in pump. Therefore infusing at 25.9 mL/hr. Double sign off by Annalise MASON.

## 2023-11-24 NOTE — H&P
[facility] History & Physical Note    Date of Service  11/23/2023    [facility] Team: [facility]   Attending: Dustin Tam M.d.  Senior Resident: Dr. Kumar  Contact Number: [phone]    Primary Care Physician  Dr. Becerril    Consultants  neurology    Specialist Names: Van Coevering III     Code Status  No Order    Chief Complaint  Chief Complaint   Patient presents with    Possible Stroke     PT is tx from [place], PT noted to have full venous occlusion while at outside facility. PT reports HA x 1 week prior. Only neuro deficit at this time is slight left sided facial droop.        History of Presenting Illness (HPI):   Khalif Wilson is a 58 y.o. male with a history of hyperlipidemia who presented 11/23/2023 with a 1 week history of painful right-sided headaches initially starting on the right posterior side then spreading to pretty much his whole head, both sharp and pressure-like sensation around the head without visual field defects, patient developed nausea and vomiting earlier in the day tried to take a marijuana gummy and had been taking Percocets for the last week without relief.  Patient treated outside hospital for presumed viral etiology, however patient had to return just a few days later with worsening symptoms as described above.  Patient also developed drooling and some right-sided facial droop at approximately 7 AM this morning and noticed weakness of his right-sided eyelid as well. Patient denies any fevers, chills, night sweats, weight gain/loss chest pain, palpitations, lower extremity edema, cough, shortness of breath, nausea/vomiting/diarrhea/constipation, melena, hematochezia, hematemesis, frequency, urgency, hematuria, dysuria, new onset of musculoskeletal pain or weakness, lightheadedness or dizziness.  Rest of 14 point review of systems as above and below.     In the emergency department patient is afebrile with temperature of 37.1 °C, pulse of 65, respiratory rate of  18, saturating 96% on room air with a blood pressure of 135/85, patient started on heparin gtt. in ED at request of neurology, CBC demonstrating erythrocytosis of 18.2 thousand otherwise unremarkable likely hemoconcentrated in the setting of nausea and vomiting earlier in the day, patient with CMP demonstrating hypokalemia potassium of 3.5 slightly elevated blood glucose of 114 not fasting.  Outside CTA demonstrating extensive dural venous sinus thrombosis.        I discussed the plan of care with patient.    Review of Systems  ROS  14 point review of systems negative except for as above below.  Past Medical History  Patient denies pertinent past medical history, only history of hyperlipidemia    Surgical History  Patient denies pertinent past surgical history    Family History  Patient denies any pertinent past family history of MI/CVA, coagulopathies, breast, or colon cancer.  Family history reviewed with patient.     Social History  Tobacco: Denies hx  Alcohol: Denies hx  Recreational drugs (illegal or prescription): Occasional MJ   Employment: Retired, lawn care  Living Situation: Lives in [place] with wife feels safe in own home was at cabin in [place]  Recent Travel: Denies any recent travel  Primary Care Provider: Reviewed    Other (stressors, spirituality, exposures): None    Allergies  No Known Allergies    Medications  None       Physical Exam  Temp:  [37.1 °C (98.7 °F)] 37.1 °C (98.7 °F)  Pulse:  [78] 78  Resp:  [18] 18  BP: (139)/(84) 139/84  SpO2:  [95 %] 95 %  Blood Pressure: 139/84   Temperature: 37.1 °C (98.7 °F)   Pulse: 78   Respiration: 18   Pulse Oximetry: 95 %       Physical Exam  General: Well developed, well nourished male, in no distress.  HEENT: NC/AT, PERRL, EOMI, no scleral icterus or conjunctival pallor,  no nasal discharge or oral erythema or exudates.   Neck: Supple, No cervical or supraclavicular LAD  CV:RRR, no murmurs gallops or Rubs, no JVD  Pulm: LCAB, no crackles, rales,  rhonchi, or wheezing  GI: Normal bowel sounds, abdomen soft, nontender, nondistended to deep or light palpation in all 4 quadrants, no HSM.  MSK: Radial and dorsalis pedis pulses 2+ and equal bilaterally, respectively.  Strength 5 out of 5 in upper and lower extremities.  No lower extremity edema  Neuro: Patient is alert and oriented x3, no focal deficits, right-sided facial droop, cranial nerves II through XII grossly intact, EOMI intact no visual field deficits.  Psych: Appropriate mood and affect         Laboratory:  Recent Labs     11/23/23  2241   WBC 10.4   RBC 5.72   HEMOGLOBIN 18.2*   HEMATOCRIT 49.8   MCV 87.1   MCH 31.8   MCHC 36.5   RDW 37.3   PLATELETCT 301   MPV 9.1     Recent Labs     11/23/23 2241   SODIUM 135   POTASSIUM 3.5*   CHLORIDE 98   CO2 22   GLUCOSE 114*   BUN 12   CREATININE 0.80   CALCIUM 8.8     Recent Labs     11/23/23 2241   ALTSGPT 26   ASTSGOT 19   ALKPHOSPHAT 85   TBILIRUBIN 1.2   GLUCOSE 114*       Imaging:  Imaging from outside hospital reviewed    Assessment/Plan:  Problem Representation:  I anticipate this patient will require at least two midnights for appropriate medical management, necessitating inpatient admission because need for heparin gtt. and close neurological monitoring    Patient will need a Intermediate Care (Adult and Pediatrics) bed on MEDICAL service .  The need is secondary to heparin GTT and close neurological monitoring.    * Cerebral venous thrombosis- (present on admission)  Assessment & Plan  Patient with 1 week history of headaches, worsening with nausea and vomiting, CTA at outside hospital demonstrating cerebral venous thrombosis, NIH stroke scale 2 on presentation, started on heparin gtt. and evaluated by neurology in the emergency department.  - Admit to [facility] for 2-hour neurochecks  - Stat CT H for any neurological changes  - CBC, CMP, heparin monitoring protocol, lipid profile, A1c  - Consider hypercoagulable work-up as appropriate  - MRI of the  brain with and without contrast and MRI venogram for further characterization of thrombus  - Continue heparin GTT  - Atorvastatin 10 mg to 80 mg  - Bedside swallow  - N.p.o. at midnight for possible procedure  - PT/OT/SLP  -Fall, seizure, aspiration precautions    Bell's palsy- (present on admission)  Assessment & Plan  Given right-sided facial droop, concern for Bell's palsy on examination, neurology to guide therapy  - Start acyclovir 1 g 3 times daily  - Consider new to appreciate neurology's assistance  -Eyepatch, eyedrops    Hypokalemia- (present on admission)  Assessment & Plan  Potassium 3.5  - Replete potassium goal greater than 4.0  - Continue to trend on BMP    Nausea and vomiting- (present on admission)  Assessment & Plan  Nonbloody nonbilious vomiting likely secondary to cerebral venous thrombosis as above  - As needed antiemetics  - Maintenance IV fluid          VTE prophylaxis: Therapeutic anticoagulation with heparin gtt.  CODE STATUS: Full code

## 2023-11-24 NOTE — ED PROVIDER NOTES
ED Provider Note    CHIEF COMPLAINT  Chief Complaint   Patient presents with    Possible Stroke     PT is tx from [place], PT noted to have full venous occlusion while at outside facility. PT reports HA x 1 week prior. Only neuro deficit at this time is slight left sided facial droop.        EXTERNAL RECORDS REVIEWED  External ED Note [facility] emergency note patient presented with right-sided facial droop that began this evening after 5 days of a headache.  CTA of the head and neck are performed which is concerning for thrombus associated with the near entire extent of the superior sagittal sinus as well as bilateral transverse and sigmoid sinus.  Without occlusion of either vert basilar or PCAs    CBC is unremarkable beyond a hemoglobin of 19 coags within normal limits negative alcohol normal CMP panel hypokalemia potassium of 2.9 glucose of 116 normal troponin    HPI/ROS  LIMITATION TO HISTORY   Select: : None  OUTSIDE HISTORIAN(S):  EMS reports no change in transport, continued heparin gtt     Khalif Wilson is a 58 y.o. male who presents to the [facility] part with chief complaint of a week of headache with some nausea and on and off blurry vision.  Presented back to [facility] today with development of right-sided facial droop.  Was found to have a very large venous sinus thrombosis.  No fevers no chills no nausea vomiting at this time.  He does have a history of hypercholesterol and takes a statin at home.  Otherwise does not smoke has no recent long travel.    PAST MEDICAL HISTORY       SURGICAL HISTORY  patient denies any surgical history    FAMILY HISTORY  History reviewed. No pertinent family history.    SOCIAL HISTORY  Social History     Tobacco Use    Smoking status: Never    Smokeless tobacco: Never   Vaping Use    Vaping Use: Never used   Substance and Sexual Activity    Alcohol use: Not Currently    Drug use: Not Currently    Sexual activity: Not on file       CURRENT MEDICATIONS  Home Medications   "     Reviewed by Viviana Nagel R.N. (Registered Nurse) on 11/23/23 at 2234  Med List Status: Partial     Medication Last Dose Status        Patient Aniceto Taking any Medications                           ALLERGIES  No Known Allergies    PHYSICAL EXAM  VITAL SIGNS: /84   Pulse 78   Temp 37.1 °C (98.7 °F) (Temporal)   Resp 18   Ht 1.727 m (5' 8\")   Wt 86.2 kg (190 lb)   SpO2 95%   BMI 28.89 kg/m²    Pulse OX: Pulse Oxygen level is within normal limits  Constitutional: Alert in no apparent distress.  HENT: Normocephalic, Atraumatic, MMM  Eyes: PERound. Conjunctiva normal, non-icteric.   Heart: Regular rate and rhythm, intact distal pulses   Lungs: Symmetrical movement, no resp distress clear to auscultation bilaterally  Abdomen: Non-tender, non-distended, normal bowel sounds  EXT/Back no edema no tenderness  Skin: Warm, Dry, No erythema, No rash.   Neurologic: Alert and oriented, cranial nerves II through XII intact except for the right-sided cranial nerve VII he has a right lower facial droop flattening of the nasolabial fold and flattening of the right forehead.  Otherwise sensation intact light touch distally in all extremities coordination intact visual fields intact strength 5 out of 5 bilateral upper and lower extremities negative Romberg no pronator drift.      DIAGNOSTIC STUDIES / PROCEDURES  EKG  I have independently interpreted this EKG  No results found for this or any previous visit.      LABS  Labs Reviewed   CBC WITH DIFFERENTIAL - Abnormal; Notable for the following components:       Result Value    Hemoglobin 18.2 (*)     Lymphocytes 19.70 (*)     Monos (Absolute) 1.01 (*)     All other components within normal limits    Narrative:     Indicate which anticoagulants the patient is on:->UNKNOWN   COMP METABOLIC PANEL - Abnormal; Notable for the following components:    Potassium 3.5 (*)     Glucose 114 (*)     All other components within normal limits    Narrative:     Indicate which " anticoagulants the patient is on:->UNKNOWN   APTT - Abnormal; Notable for the following components:    APTT 67.6 (*)     All other components within normal limits    Narrative:     Indicate which anticoagulants the patient is on:->UNKNOWN   LIPID PROFILE - Abnormal; Notable for the following components:     (*)     All other components within normal limits    Narrative:     Indicate which anticoagulants the patient is on:->UNKNOWN   PROTHROMBIN TIME    Narrative:     Indicate which anticoagulants the patient is on:->UNKNOWN   ESTIMATED GFR    Narrative:     Indicate which anticoagulants the patient is on:->UNKNOWN   HEPARIN XA (UNFRACTIONATED)    Narrative:     Indicate which anticoagulants the patient is on:->UNKNOWN   MAGNESIUM    Narrative:     Indicate which anticoagulants the patient is on:->UNKNOWN   HEMOGLOBIN A1C    Narrative:     Indicate which anticoagulants the patient is on:->UNKNOWN   CBC WITHOUT DIFFERENTIAL   COMP METABOLIC PANEL             COURSE & MEDICAL DECISION MAKING    ED Observation Status? No; Patient does not meet criteria for ED Observation.     INITIAL ASSESSMENT, COURSE AND PLAN  Care Narrative:       Patient is a 58-year-old male who presents emergency department as a transfer from [place] for large venous sinus thrombosis.  Neurologic examination is actually consistent with a Bell's palsy rather than an acute stroke.  Neurology was alerted when the patient's arrival and he is expected to come down to the bedside.  Patient is mildly hypertensive but otherwise well-appearing the plan is to continue the heparin drip.    DISPOSITION AND DISCUSSIONS  11:10 PM  Spoke with Dr. Carey on-call with neurology.  He states that he also believes this is a Bell's palsy in the setting of venous thrombosis.  Recommends the IMCU with every 2 hour neurochecks.  He is going to do some research to ensure that we can start the prednisone but at this time we will hold it.  There is no plan for  IR at this time.    I spoke with Dr. Matson on-call for the ICU and he agrees to the IMCU admission.  I spoke with the medicine resident for IMCU admission as well.      Spoke with the patient's brother-in-law who is a neuroradiologist in [place] regarding the patient's prognosis      11:56 PM  Spoke w neuro again Dr Shaver to start steroids for bells palsy at this time.    I have discussed management of the patient with the following physicians and CORNELIO's:  DR Duran, Med resident     Discussion of management with other QHP or appropriate source(s): Pharmacy for continued heparin         Decision tools and prescription drugs considered including, but not limited to: NIH Stroke Scale 2 .    FINAL DIAGNOSIS  1. Acute cerebral venous sinus thrombosis    2. Bell's palsy    3. Cerebral venous thrombosis           Electronically signed by: Sarah Carl M.D., 11/23/2023 10:44 PM

## 2023-11-24 NOTE — THERAPY
Physical Therapy Contact Note    Patient Name: Khalif Wilson  Age:  58 y.o., Sex:  male  Medical Record #: [id number]  Today's Date: 11/24/2023    PT eval attempted. Obtained PLOF/living set-up info. Pt c/o 6/10 HA and some pressure in his chest. BP noted to be elevatd at 165/85 - RN notified. Will defer PT eval until pt within BP parameters for mobility assessment.       Precautions   Precautions Fall Risk   Comments SBP goal of 100-140   Vitals   Blood Pressure (!) 165/85  (RN aware)   Prior Living Situation   Prior Services Home-Independent   Housing / Facility 1 Story House   Steps Into Home 0   Equipment Owned None   Lives with - Patient's Self Care Capacity Spouse   Comments Pt from [place], currently staying at his cabin in [facility] with loft   Prior Level of Functional Mobility   Bed Mobility Independent   Transfer Status Independent   Ambulation Independent   Ambulation Distance Community distances   Assistive Devices Used None   Stairs Independent

## 2023-11-24 NOTE — PROGRESS NOTES
4 Eyes Skin Assessment Completed by MARLON Teague and MARLON Fu.    Head WDL  Ears WDL  Nose WDL  Mouth WDL  Neck WDL  Breast/Chest WDL  Shoulder Blades WDL  Spine WDL  (R) Arm/Elbow/Hand WDL  (L) Arm/Elbow/Hand WDL  Abdomen WDL  Groin WDL  Scrotum/Coccyx/Buttocks WDL  (R) Leg WDL  (L) Leg WDL  (R) Heel/Foot/Toe  Scratches on anterior right foot  (L) Heel/Foot/Toe WDL          Devices In Places ECG, Blood Pressure Cuff, Pulse Ox, and SCD's      Interventions In Place Low Air Loss Mattress    Possible Skin Injury No    Pictures Uploaded Into Epic N/A  Wound Consult Placed N/A  RN Wound Prevention Protocol Ordered No

## 2023-11-24 NOTE — DISCHARGE PLANNING
[facility] Transitional Care Coordination    Referral from:  Dr. Yadiel Kumar  Insurance Provider on Facesheet:  Sylvie  Potential Rehab diagnosis:   Dural venous sinus thrombosis    Chart review indicates patient has ongoing medical management and may have therapy needs to possibly meet inpatient rehab facility criteria with the goal of returning to community.      D/C Support:  Spouse    Physiatry consult pended, waiting for additional information.   Dural venous sinus thrombosis;  Bell's Palsy.Heparin gtt.   Workup ongoing, MR brain pending.   PT/OT pending as clinically appropriate.  TCC will follow.  Please reach out sooner if PMR consult requested for medical management.     Last Covid test:    Thank you for the referral.

## 2023-11-24 NOTE — THERAPY
Occupational Therapy Contact Note    Patient Name: Khalif Wilson  Age:  58 y.o., Sex:  male  Medical Record #: [id number]  Today's Date: 11/24/2023    Attempted to see patient for OT evaluation, pts BP above parameters alerted RN who planned to medicate. Will follow up as able/appropriate for full evaluation.     11/24/23 9704   Prior Living Situation   Prior Services Home-Independent   Housing / Facility 1 Story House   Steps Into Home 0   Bathroom Set up Walk In Shower   Equipment Owned None   Lives with - Patient's Self Care Capacity Spouse   Comments Was staying at [facility] prior to admission, lives in [place]   Prior Level of ADL Function   Self Feeding Independent   Grooming / Hygiene Independent   Bathing Independent   Dressing Independent   Toileting Independent   Prior Level of IADL Function   Medication Management Independent   Laundry Independent   Kitchen Mobility Independent   Finances Independent   Home Management Independent   Shopping Independent   Prior Level Of Mobility Independent Without Device in Community   Driving / Transportation Driving Independent

## 2023-11-24 NOTE — DISCHARGE PLANNING
Case Management Discharge Planning    Admission Date: 11/23/2023  GMLOS:    ALOS: 1    6-Clicks ADL Score: 24  6-Clicks Mobility Score: 24      Anticipated Discharge Disposition: Discharged to home/self care (01)  Discharge Address: [address] [place] [address], [place] [address]  Discharge Contact Phone Number: [phone]    DME Needed: No    Action(s) Taken: Chart reviewed & case discussed in ICU rounds:  Patient admitted on 11/23/2023 with cerebral venous thrombosis, Bell's palsy, hypokalemia, and N/V.   +Heparin drip/Q2H neurochecks.  Pending MRI Brain and MRV Head.  Neurology following.    CM met at bedside with patient to complete assessment and discuss DCP.   Patient, AAOx4 at time of interview, reported living with his wife, Afshan, in a single level house at address listed on Facesheet ([address] [place] [address], [place] [address]) and plans to return to same residence upon discharge.   Return transportation to home will provided by his spouse.   Prior to admission patient was independent with ADLs and ambulating w/o assistive devices.  He denies any DME use.  No Hx of ETOH or drug abuse.   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    PCP:  Dr. HERNANDEZ in [place], [place] (patient cannot recall his first name)    PT/OT/SLP eval pending at time of encounter; No anticipated CM DC needs identified at this time.    Escalations Completed: None    Medically Clear: No    Next Steps: CM will f/u on medical clearance & PT/OT/SLP eval for DC recommendations/referrals as needed    Barriers to Discharge: Medical clearance, Pending PT/OT/SLP eval    Is the patient up for discharge tomorrow: No      Care Transition Team Assessment    Information Source  Orientation Level: Oriented X4  Information Given By: Patient  Who is responsible for making decisions for patient? : Patient    Readmission Evaluation  Is this a readmission?: No    Elopement Risk  Legal Hold: No  Ambulatory or Self Mobile in Wheelchair: No-Not an  Elopement Risk  Elopement Risk: Not at Risk for Elopement    Interdisciplinary Discharge Planning  Lives with - Patient's Self Care Capacity: Spouse  Housing / Facility: [address]  Prior Services: Home-Independent    Discharge Preparedness  What is your plan after discharge?: Home with help  What are your discharge supports?: Spouse  Prior Functional Level: Independent with Activities of Daily Living, Ambulatory, Drives Self    Functional Assesment  Prior Functional Level: Independent with Activities of Daily Living, Ambulatory, Drives Self    Finances  Financial Barriers to Discharge: No  Prescription Coverage: Yes    Vision / Hearing Impairment  Right Eye Vision: Impaired, Other (Comments) (looking to right, vision narrows)     Advance Directive  Advance Directive?: None  Advance Directive offered?: AD Booklet given    Domestic Abuse  Have you ever been the victim of abuse or violence?: No  Physical Abuse or Sexual Abuse: No  Verbal Abuse or Emotional Abuse: No  Possible Abuse/Neglect Reported to:: Not Applicable    Psychological Assessment  History of Substance Abuse: None  History of Psychiatric Problems: No  Non-compliant with Treatment: No  Newly Diagnosed Illness: Yes    Discharge Risks or Barriers  Discharge risks or barriers?: Complex medical needs  Patient risk factors: Complex medical needs    Anticipated Discharge Information  Discharge Disposition: Discharged to home/self care (01)  Discharge Address: [facility] [place] [address]  Discharge Contact Phone Number: [phone]

## 2023-11-25 PROBLEM — E87.1 HYPONATREMIA: Status: ACTIVE | Noted: 2023-11-25

## 2023-11-25 PROBLEM — I10 HTN (HYPERTENSION): Status: ACTIVE | Noted: 2023-11-25

## 2023-11-25 LAB
ALBUMIN SERPL BCP-MCNC: 4.1 G/DL (ref 3.2–4.9)
ALBUMIN/GLOB SERPL: 1.2 G/DL
ALP SERPL-CCNC: 84 U/L (ref 30–99)
ALT SERPL-CCNC: 25 U/L (ref 2–50)
ANION GAP SERPL CALC-SCNC: 11 MMOL/L (ref 7–16)
AST SERPL-CCNC: 13 U/L (ref 12–45)
BILIRUB SERPL-MCNC: 0.8 MG/DL (ref 0.1–1.5)
BUN SERPL-MCNC: 11 MG/DL (ref 8–22)
CALCIUM ALBUM COR SERPL-MCNC: 9.3 MG/DL (ref 8.5–10.5)
CALCIUM SERPL-MCNC: 9.4 MG/DL (ref 8.5–10.5)
CHLORIDE SERPL-SCNC: 98 MMOL/L (ref 96–112)
CHOLEST SERPL-MCNC: 176 MG/DL (ref 100–199)
CO2 SERPL-SCNC: 22 MMOL/L (ref 20–33)
CREAT SERPL-MCNC: 0.6 MG/DL (ref 0.5–1.4)
ERYTHROCYTE [DISTWIDTH] IN BLOOD BY AUTOMATED COUNT: 37 FL (ref 35.9–50)
GFR SERPLBLD CREATININE-BSD FMLA CKD-EPI: 113 ML/MIN/1.73 M 2
GLOBULIN SER CALC-MCNC: 3.3 G/DL (ref 1.9–3.5)
GLUCOSE SERPL-MCNC: 143 MG/DL (ref 65–99)
HCT VFR BLD AUTO: 49.2 % (ref 42–52)
HDLC SERPL-MCNC: 48 MG/DL
HGB BLD-MCNC: 17.6 G/DL (ref 14–18)
LDLC SERPL CALC-MCNC: 113 MG/DL
MAGNESIUM SERPL-MCNC: 2.2 MG/DL (ref 1.5–2.5)
MCH RBC QN AUTO: 31.2 PG (ref 27–33)
MCHC RBC AUTO-ENTMCNC: 35.8 G/DL (ref 32.3–36.5)
MCV RBC AUTO: 87.1 FL (ref 81.4–97.8)
PLATELET # BLD AUTO: 323 K/UL (ref 164–446)
PMV BLD AUTO: 9.1 FL (ref 9–12.9)
POTASSIUM SERPL-SCNC: 4.1 MMOL/L (ref 3.6–5.5)
PROT SERPL-MCNC: 7.4 G/DL (ref 6–8.2)
RBC # BLD AUTO: 5.65 M/UL (ref 4.7–6.1)
SODIUM SERPL-SCNC: 131 MMOL/L (ref 135–145)
TRIGL SERPL-MCNC: 73 MG/DL (ref 0–149)
UFH PPP CHRO-ACNC: 0.52 IU/ML
WBC # BLD AUTO: 11 K/UL (ref 4.8–10.8)

## 2023-11-25 PROCEDURE — 700111 HCHG RX REV CODE 636 W/ 250 OVERRIDE (IP): Performed by: HOSPITALIST

## 2023-11-25 PROCEDURE — 80053 COMPREHEN METABOLIC PANEL: CPT

## 2023-11-25 PROCEDURE — 700111 HCHG RX REV CODE 636 W/ 250 OVERRIDE (IP): Performed by: INTERNAL MEDICINE

## 2023-11-25 PROCEDURE — 83735 ASSAY OF MAGNESIUM: CPT

## 2023-11-25 PROCEDURE — A9270 NON-COVERED ITEM OR SERVICE: HCPCS | Performed by: STUDENT IN AN ORGANIZED HEALTH CARE EDUCATION/TRAINING PROGRAM

## 2023-11-25 PROCEDURE — 700111 HCHG RX REV CODE 636 W/ 250 OVERRIDE (IP): Mod: JZ | Performed by: STUDENT IN AN ORGANIZED HEALTH CARE EDUCATION/TRAINING PROGRAM

## 2023-11-25 PROCEDURE — 80061 LIPID PANEL: CPT

## 2023-11-25 PROCEDURE — A9270 NON-COVERED ITEM OR SERVICE: HCPCS | Performed by: HOSPITALIST

## 2023-11-25 PROCEDURE — 85027 COMPLETE CBC AUTOMATED: CPT

## 2023-11-25 PROCEDURE — 99291 CRITICAL CARE FIRST HOUR: CPT | Performed by: HOSPITALIST

## 2023-11-25 PROCEDURE — 99232 SBSQ HOSP IP/OBS MODERATE 35: CPT | Performed by: STUDENT IN AN ORGANIZED HEALTH CARE EDUCATION/TRAINING PROGRAM

## 2023-11-25 PROCEDURE — 770000 HCHG ROOM/CARE - INTERMEDIATE ICU *

## 2023-11-25 PROCEDURE — 700102 HCHG RX REV CODE 250 W/ 637 OVERRIDE(OP): Performed by: HOSPITALIST

## 2023-11-25 PROCEDURE — 85520 HEPARIN ASSAY: CPT

## 2023-11-25 PROCEDURE — 700102 HCHG RX REV CODE 250 W/ 637 OVERRIDE(OP): Performed by: STUDENT IN AN ORGANIZED HEALTH CARE EDUCATION/TRAINING PROGRAM

## 2023-11-25 RX ORDER — SODIUM CHLORIDE 1 G/1
1 TABLET ORAL 2 TIMES DAILY
Status: DISCONTINUED | OUTPATIENT
Start: 2023-11-25 | End: 2023-11-27

## 2023-11-25 RX ORDER — AMLODIPINE BESYLATE 5 MG/1
10 TABLET ORAL
Status: DISCONTINUED | OUTPATIENT
Start: 2023-11-26 | End: 2023-11-28 | Stop reason: HOSPADM

## 2023-11-25 RX ADMIN — OXYCODONE 5 MG: 5 TABLET ORAL at 16:54

## 2023-11-25 RX ADMIN — LABETALOL HYDROCHLORIDE 10 MG: 5 INJECTION, SOLUTION INTRAVENOUS at 06:16

## 2023-11-25 RX ADMIN — AMLODIPINE BESYLATE 5 MG: 5 TABLET ORAL at 05:57

## 2023-11-25 RX ADMIN — VALACYCLOVIR HYDROCHLORIDE 1000 MG: 500 TABLET, FILM COATED ORAL at 16:54

## 2023-11-25 RX ADMIN — VALACYCLOVIR HYDROCHLORIDE 1000 MG: 500 TABLET, FILM COATED ORAL at 11:41

## 2023-11-25 RX ADMIN — MINERAL OIL, PETROLATUM 1 APPLICATION: 425; 568 OINTMENT OPHTHALMIC at 21:54

## 2023-11-25 RX ADMIN — SENNOSIDES AND DOCUSATE SODIUM 2 TABLET: 50; 8.6 TABLET ORAL at 18:00

## 2023-11-25 RX ADMIN — LABETALOL HYDROCHLORIDE 10 MG: 5 INJECTION, SOLUTION INTRAVENOUS at 04:30

## 2023-11-25 RX ADMIN — CARBOXYMETHYLCELLULOSE SODIUM 1 DROP: 5 SOLUTION/ DROPS OPHTHALMIC at 05:57

## 2023-11-25 RX ADMIN — ATORVASTATIN CALCIUM 80 MG: 80 TABLET, FILM COATED ORAL at 16:54

## 2023-11-25 RX ADMIN — OXYCODONE 5 MG: 5 TABLET ORAL at 22:23

## 2023-11-25 RX ADMIN — PREDNISONE 60 MG: 50 TABLET ORAL at 05:57

## 2023-11-25 RX ADMIN — SODIUM CHLORIDE 1 G: 1 TABLET ORAL at 17:09

## 2023-11-25 RX ADMIN — LABETALOL HYDROCHLORIDE 10 MG: 5 INJECTION, SOLUTION INTRAVENOUS at 10:30

## 2023-11-25 RX ADMIN — CARBOXYMETHYLCELLULOSE SODIUM 1 DROP: 5 SOLUTION/ DROPS OPHTHALMIC at 13:46

## 2023-11-25 RX ADMIN — HEPARIN SODIUM 15 UNITS/KG/HR: 5000 INJECTION, SOLUTION INTRAVENOUS at 14:51

## 2023-11-25 RX ADMIN — OXYCODONE 5 MG: 5 TABLET ORAL at 06:07

## 2023-11-25 RX ADMIN — OXYCODONE 5 MG: 5 TABLET ORAL at 00:02

## 2023-11-25 RX ADMIN — SENNOSIDES AND DOCUSATE SODIUM 2 TABLET: 50; 8.6 TABLET ORAL at 05:57

## 2023-11-25 RX ADMIN — LABETALOL HYDROCHLORIDE 10 MG: 5 INJECTION, SOLUTION INTRAVENOUS at 13:53

## 2023-11-25 RX ADMIN — LABETALOL HYDROCHLORIDE 10 MG: 5 INJECTION, SOLUTION INTRAVENOUS at 17:34

## 2023-11-25 RX ADMIN — OXYCODONE 5 MG: 5 TABLET ORAL at 11:47

## 2023-11-25 RX ADMIN — LABETALOL HYDROCHLORIDE 10 MG: 5 INJECTION, SOLUTION INTRAVENOUS at 23:12

## 2023-11-25 RX ADMIN — MORPHINE SULFATE 4 MG: 4 INJECTION, SOLUTION INTRAMUSCULAR; INTRAVENOUS at 07:36

## 2023-11-25 RX ADMIN — ACETAMINOPHEN 650 MG: 325 TABLET, FILM COATED ORAL at 22:23

## 2023-11-25 RX ADMIN — VALACYCLOVIR HYDROCHLORIDE 1000 MG: 500 TABLET, FILM COATED ORAL at 05:57

## 2023-11-25 ASSESSMENT — PAIN DESCRIPTION - PAIN TYPE
TYPE: ACUTE PAIN

## 2023-11-25 ASSESSMENT — ENCOUNTER SYMPTOMS
HEADACHES: 1
FEVER: 0
FOCAL WEAKNESS: 1

## 2023-11-25 NOTE — PROGRESS NOTES
Pt arrived to unit via hospital bed at 1000. Pt oriented to room, unit, and plan of care. Pt is A&Ox4. Neuro check was completed at bedside with RICU nurse.Pt had Right facial droop.  BP was 158/97, (SBP goal is 100-140) PRN labetalol was administered per MAR. Tele-monitor placed and monitor room notified. All questions answered at this time. Call light within reach; fall precautions in place.

## 2023-11-25 NOTE — CARE PLAN
The patient is Watcher - Medium risk of patient condition declining or worsening    Shift Goals  Clinical Goals: -140, Q2 neuro, pain control  Patient Goals: pain control, rest, get better  Family Goals: updates    Progress made toward(s) clinical / shift goals:        Problem: Pain - Standard  Goal: Alleviation of pain or a reduction in pain to the patient’s comfort goal  Outcome: Progressing  Note: Pt assessed for pain regularly and medicated PRN per MAR.      Problem: Knowledge Deficit - Standard  Goal: Patient and family/care givers will demonstrate understanding of plan of care, disease process/condition, diagnostic tests and medications  Outcome: Progressing  Note: Pt educated regarding plan of care and medications. All questions answered.

## 2023-11-25 NOTE — CARE PLAN
The patient is Watcher - Medium risk of patient condition declining or worsening    Shift Goals  Clinical Goals: -140, Q2 neuro, pain management  Patient Goals: pain management, rest  Family Goals: Updates    Progress made toward(s) clinical / shift goals:     Neuro exam remains stable. Patient required multiple PRNs and a scheduled medication for BP control. Pain required many PRNs for pain control as well.    Patient is not progressing towards the following goals:      Problem: Pain - Standard  Goal: Alleviation of pain or a reduction in pain to the patient’s comfort goal  Outcome: Not Progressing

## 2023-11-25 NOTE — THERAPY
Speech Language Therapy Contact Note    Patient Name: Khalif Wilson  Age:  58 y.o., Sex:  male  Medical Record #: [id number]  Today's Date: 11/24/2023 11/24/23 1612   Treatment Variance   Reason For Missed Therapy Non-Medical - Other (Please Comment)   Initial Contact Note    Initial Contact Note  Order Received and Verified, Speech Therapy Evaluation in Progress with Full Report to Follow.   Interdisciplinary Plan of Care Collaboration   IDT Collaboration with  Nursing   Collaboration Comments RN reports patient tolerating regular diet textures without concerns. Facial droop in setting of Wister Palsy. Swallow evaluation not indicated. SLP will sign off.

## 2023-11-25 NOTE — CARE PLAN
The patient is Watcher - Medium risk of patient condition declining or worsening    Shift Goals  Clinical Goals: -140, Q2 neuro, pain management  Patient Goals: pain management, rest  Family Goals: Updates    Progress made toward(s) clinical / shift goals:    Problem: Neuro Status  Goal: Neuro status will remain stable or improve  Outcome: Progressing       Patient is not progressing towards the following goals:      Problem: Pain - Standard  Goal: Alleviation of pain or a reduction in pain to the patient’s comfort goal  11/25/2023 0139 by Zahida Inman, R.N.  Outcome: Not Met  11/25/2023 0138 by Zahida Inman, R.N.  Outcome: Not Met     Patient had medications throughout night to attempt pain management -- also tried nonpharmacologic interventions to manage pain -- pain has not yet been alleviated

## 2023-11-25 NOTE — DISCHARGE PLANNING
Following for post acute services Mobility and self care score 24/24 indicating limited therapy need for IRF level of care. Per SLP no acute intervention at this time. Anticipate home with OP support when medically cleared. No physiatry consult ordered per protocol.

## 2023-11-25 NOTE — PROGRESS NOTES
Neurology Progress Note  Neurohospitalist Service, [facility]    Referring Physician: Mark Garcia M.D.      Interval History: Patient seen and examined this AM. Patient doing well but continues to complain of headache, the pain is stable, not worse. Patient denies any other new acute neurologic concerns. No reported acute overnight events.    Review of systems: In addition to what is detailed in the HPI and/or updated in the interval history, all other systems reviewed and are negative.    Past Medical History, Past Surgical History and Social History reviewed and unchanged from prior    Medications:    Current Facility-Administered Medications:     [START ON 11/26/2023] amLODIPine (Norvasc) tablet 10 mg, 10 mg, Oral, Q DAY, Mark Garcia M.D.    senna-docusate (Pericolace Or Senokot S) 8.6-50 MG per tablet 2 Tablet, 2 Tablet, Oral, BID, 2 Tablet at 11/25/23 0557 **AND** polyethylene glycol/lytes (Miralax) PACKET 1 Packet, 1 Packet, Oral, QDAY PRN, 1 Packet at 11/24/23 1709 **AND** magnesium hydroxide (Milk Of Magnesia) suspension 30 mL, 30 mL, Oral, QDAY PRN **AND** bisacodyl (Dulcolax) suppository 10 mg, 10 mg, Rectal, QDAY PRN, Yadiel Kumar M.D.    Respiratory Therapy Consult, , Nebulization, Continuous RT, Yadiel Kumar M.D.    acetaminophen (Tylenol) tablet 650 mg, 650 mg, Oral, Q6HRS PRN, Yadiel Kumar M.D., 650 mg at 11/24/23 2313    atorvastatin (Lipitor) tablet 80 mg, 80 mg, Oral, Q EVENING, Yadiel Kumar M.D., 80 mg at 11/24/23 1709    NS (Bolus) 0.9 % infusion 500 mL, 500 mL, Intravenous, Once PRN, Yadiel Kumar M.D.    ondansetron (Zofran) syringe/vial injection 4 mg, 4 mg, Intravenous, Q4HRS PRN, Yadiel Kumar M.D.    morphine 4 MG/ML injection 4 mg, 4 mg, Intravenous, Q4HRS PRN, Yadiel Kumar M.D., 4 mg at 11/25/23 0736    carboxymethylcellulose (Refresh Tears) 0.5 % ophthalmic drops 1 Drop, 1 Drop, Both Eyes, BID PRN, Cutter J  "GENET Kumar, 1 Drop at 11/24/23 0214    valACYclovir (Valtrex) caplet 1,000 mg, 1,000 mg, Oral, TID, Yadiel Kumar M.D., 1,000 mg at 11/25/23 1141    oxyCODONE immediate-release (Roxicodone) tablet 5 mg, 5 mg, Oral, Q4HRS PRN, Mark Garcia M.D., 5 mg at 11/25/23 1147    heparin infusion 25,000 units in 500 mL 0.45% NACL, 0-30 Units/kg/hr (Order-Specific), Intravenous, Continuous, Mark Garcia M.D., Last Rate: 25.9 mL/hr at 11/25/23 1016, 15 Units/kg/hr at 11/25/23 1016    heparin injection 3,400 Units, 40 Units/kg (Order-Specific), Intravenous, PRN, Mark Garcia M.D.    predniSONE (Deltasone) tablet 60 mg, 60 mg, Oral, DAILY, Mark Garcia M.D., 60 mg at 11/25/23 0557    artificial tears (Eye Lubricant) ophth ointment 1 Application, 1 Application, Right Eye, Nightly, Mark Garcia M.D., 1 Application at 11/24/23 2115    carboxymethylcellulose (Refresh Tears) 0.5 % ophthalmic drops 1 Drop, 1 Drop, Right Eye, BID, Mark Garcia M.D., 1 Drop at 11/25/23 1346    labetalol (Normodyne/Trandate) injection 10 mg, 10 mg, Intravenous, Q10 MIN PRN, Cami Loyd M.D., 10 mg at 11/25/23 1353    Physical Examination:   /81   Pulse (!) 58   Temp 36.1 °C (96.9 °F) (Temporal)   Resp (!) 29   Ht 1.727 m (5' 8\")   Wt 83.6 kg (184 lb 4.9 oz)   SpO2 95%   BMI 28.02 kg/m²     NEUROLOGICAL EXAM:     Neuro exam completed today and demonstrates no changes. 11/25/2023    MENTAL STATUS: Awake, alert and oriented to person, place, time and situation, attention and memory intact  LANG/SPEECH: Naming and repetition intact, fluent speech, follows simple, two-step, multi-step and complex commands.     CRANIAL NERVES:  II: Pupils equal and reactive, no VF deficits  III, IV, VI: EOM intact, no gaze preference or deviation, no nystagmus.  V: normal sensation in V1, V2, and V3 segments bilaterally  VII: Right facial asymmetry including right frontalis weakness right orbicularis " oculi weakness subtle nasolabial fold loss and asymmetric smile with right facial droop.  VIII: normal hearing to speech  IX, X: normal palatal elevation, no uvular deviation  XI: 5/5 head turn and 5/5 shoulder shrug bilaterally  XII: midline tongue protrusion     MOTOR: Antigravity movement in bilateral upper and lower extremities. Full and symmetric power in proximal and distal muscle groups in bilateral upper and lower extremities     REFLEXES: bilateral flexor plantar response, no clonus  SENSORY: Normal to fine touch in all extremities  COORD: Normal finger to nose, no tremor, no dysmetria  GAIT: Deferred    Objective Data:    Labs:  Lab Results   Component Value Date/Time    PROTHROMBTM 14.4 11/23/2023 10:41 PM    INR 1.11 11/23/2023 10:41 PM      Lab Results   Component Value Date/Time    WBC 11.0 (H) 11/25/2023 04:18 AM    RBC 5.65 11/25/2023 04:18 AM    HEMOGLOBIN 17.6 11/25/2023 04:18 AM    HEMATOCRIT 49.2 11/25/2023 04:18 AM    MCV 87.1 11/25/2023 04:18 AM    MCH 31.2 11/25/2023 04:18 AM    MCHC 35.8 11/25/2023 04:18 AM    MPV 9.1 11/25/2023 04:18 AM    NEUTSPOLYS 68.70 11/23/2023 10:41 PM    LYMPHOCYTES 19.70 (L) 11/23/2023 10:41 PM    MONOCYTES 9.70 11/23/2023 10:41 PM    EOSINOPHILS 1.10 11/23/2023 10:41 PM    BASOPHILS 0.50 11/23/2023 10:41 PM      Lab Results   Component Value Date/Time    SODIUM 131 (L) 11/25/2023 04:18 AM    POTASSIUM 4.1 11/25/2023 04:18 AM    CHLORIDE 98 11/25/2023 04:18 AM    CO2 22 11/25/2023 04:18 AM    GLUCOSE 143 (H) 11/25/2023 04:18 AM    BUN 11 11/25/2023 04:18 AM    CREATININE 0.60 11/25/2023 04:18 AM      Lab Results   Component Value Date/Time    CHOLSTRLTOT 176 11/25/2023 04:18 AM     (H) 11/25/2023 04:18 AM    HDL 48 11/25/2023 04:18 AM    TRIGLYCERIDE 73 11/25/2023 04:18 AM       Lab Results   Component Value Date/Time    ALKPHOSPHAT 84 11/25/2023 04:18 AM    ASTSGOT 13 11/25/2023 04:18 AM    ALTSGPT 25 11/25/2023 04:18 AM    TBILIRUBIN 0.8 11/25/2023 04:18 AM         Imaging/Testing:    I interpreted and/or reviewed the patient's neuroimaging    MR-BRAIN-WITH & W/O   Final Result      1.  Patchy area of T2 and FLAIR hyperintensity in the left posterior frontal deep white matter with prominent venous flow voids in this vicinity. 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   2.  Dural venous sinus thrombosis superior sagittal sinus, bilateral transverse sinuses, left sigmoid sinus.      MR-VENOGRAM (MRV) HEAD   Final Result   Addendum (preliminary) 1 of 1   ADDENDUM/correction on the technique portion of the report: (Exam was not    and MRA [place] but rather a cerebral MR venogram). See    correction below.      TECHNIQUE/EXAM DESCRIPTION:   MRV - Cerebral Magnetic Resonance Venogram.      The study was performed on a Corrine 3.0 Cookie MRI scanner.   Cerebral Magnetic Resonance Venography (MRV) was performed with 2D    time-of-flight (TOF) technique with acquisitions obtained in the axial,    coronal, and sagittal planes. Images are reviewed at the PACS or    Robert-Recon workstations as source images in all 3    planes as well as maximum intensity ray projection (MIP) multiplanar 3D    reconstructions.      Final      1.  Study is positive for dural venous sinus thrombosis. Superior sagittal sinus thrombosis, bilateral transverse sinus thrombosis, left sigmoid sinus thrombosis.   2.  Findings are concordant with CT-CTA of the head outside films.          Assessment and Plan:  Khalif Wilson is a 58 y.o. male with history of hyperlipidemia who presented as a transfer from [facility] after finding of  extensive dural venous sinus thrombosis on outside CTA.  Patient begun on heparin drip and transferred to [facility] for higher level of care.  On physical exam patient demonstrates right lower motor neuron weakness pattern consistent with Bell's palsy.  Based on patient's pattern of weakness I  have low suspicion for an acute ischemic infarct however follow-up MRI will clarify.  It is possible that a viral etiology post explains patient's dural venous sinus thrombosis secondary to acute inflammation and his acute facial nerve weakness.  MRI brain and MRI venogram redemonstrate extensive dural venous sinus thrombosis in superior sagittal sinus, left transverse sinus and left sigmoid sinus. No evidence of acute infarction. There is evidence of left basal ganglia developmental venous anomaly with evidence of overt bleeding but does have associated hazy T2 hyperintense signal. This likely has no current clinical significance. Current plan will be to initiate oral anticoagulation beginning tomorrow but would like to observe some resolution of underlying headache. Patient will be treated for a total of 3-6 months. Can consider outpatient hypercoagulable work up but acutely would be of limited value secondary to IV heparin use and likely inflammatory state.      Problem list:  -- Dural venous sinus thrombosis        Plan:  -- Admit to IMCU w/ q2 neurochecks  -- Systolic blood pressure goal should be normotension, -140  -- Continue heparin gtt DVT protocol  -- STAT CTH for any acute neurologic changes  -- Likely will transition to oral AC tomorrow from heparin gtt, would treat with Eliquis 5 mg BID, decision to initiate oral therapy is pending further clinical improvement or deterioration  -- Once transitioned to oral anticoagulation patient will be treated for 3 to 6 months and have follow-up as an outpatient basis for repeat vascular imaging  -- Patient will be on therapeutic heparin gtt; no need for further mechanical or chemo DVT ppx  -- Patient on Prednisone and Valtrex for Rt-sided Bell's palsy  -- PT/OT/SLP  -- Recommend vascular neurology follow up for DVST on discharge  -- Recommend hematology follow up on discharge for hypercoagulable evaluation/work up     I have performed a physical exam and  reviewed and updated ROS and Plan today (11/25/2023).     Herman Aquino III, MD  Vascular Neurology, [facility]

## 2023-11-26 ENCOUNTER — APPOINTMENT (OUTPATIENT)
Dept: RADIOLOGY | Facility: MEDICAL CENTER | Age: 58
DRG: 091 | End: 2023-11-26
Attending: HOSPITALIST
Payer: COMMERCIAL

## 2023-11-26 LAB
ANION GAP SERPL CALC-SCNC: 13 MMOL/L (ref 7–16)
BUN SERPL-MCNC: 16 MG/DL (ref 8–22)
CALCIUM SERPL-MCNC: 9.1 MG/DL (ref 8.5–10.5)
CHLORIDE SERPL-SCNC: 99 MMOL/L (ref 96–112)
CO2 SERPL-SCNC: 23 MMOL/L (ref 20–33)
CREAT SERPL-MCNC: 0.71 MG/DL (ref 0.5–1.4)
ERYTHROCYTE [DISTWIDTH] IN BLOOD BY AUTOMATED COUNT: 37.7 FL (ref 35.9–50)
GFR SERPLBLD CREATININE-BSD FMLA CKD-EPI: 107 ML/MIN/1.73 M 2
GLUCOSE SERPL-MCNC: 122 MG/DL (ref 65–99)
HCT VFR BLD AUTO: 50.7 % (ref 42–52)
HGB BLD-MCNC: 17.8 G/DL (ref 14–18)
MCH RBC QN AUTO: 31.4 PG (ref 27–33)
MCHC RBC AUTO-ENTMCNC: 35.1 G/DL (ref 32.3–36.5)
MCV RBC AUTO: 89.6 FL (ref 81.4–97.8)
PLATELET # BLD AUTO: 328 K/UL (ref 164–446)
PMV BLD AUTO: 9.1 FL (ref 9–12.9)
POTASSIUM SERPL-SCNC: 3.9 MMOL/L (ref 3.6–5.5)
RBC # BLD AUTO: 5.66 M/UL (ref 4.7–6.1)
SODIUM SERPL-SCNC: 135 MMOL/L (ref 135–145)
UFH PPP CHRO-ACNC: 0.6 IU/ML
WBC # BLD AUTO: 15.8 K/UL (ref 4.8–10.8)

## 2023-11-26 PROCEDURE — 85027 COMPLETE CBC AUTOMATED: CPT

## 2023-11-26 PROCEDURE — A9270 NON-COVERED ITEM OR SERVICE: HCPCS | Performed by: HOSPITALIST

## 2023-11-26 PROCEDURE — 770000 HCHG ROOM/CARE - INTERMEDIATE ICU *

## 2023-11-26 PROCEDURE — 700102 HCHG RX REV CODE 250 W/ 637 OVERRIDE(OP): Performed by: HOSPITALIST

## 2023-11-26 PROCEDURE — A9270 NON-COVERED ITEM OR SERVICE: HCPCS | Performed by: STUDENT IN AN ORGANIZED HEALTH CARE EDUCATION/TRAINING PROGRAM

## 2023-11-26 PROCEDURE — 700102 HCHG RX REV CODE 250 W/ 637 OVERRIDE(OP): Performed by: STUDENT IN AN ORGANIZED HEALTH CARE EDUCATION/TRAINING PROGRAM

## 2023-11-26 PROCEDURE — 700111 HCHG RX REV CODE 636 W/ 250 OVERRIDE (IP): Performed by: HOSPITALIST

## 2023-11-26 PROCEDURE — 80048 BASIC METABOLIC PNL TOTAL CA: CPT

## 2023-11-26 PROCEDURE — 70450 CT HEAD/BRAIN W/O DYE: CPT

## 2023-11-26 PROCEDURE — 99233 SBSQ HOSP IP/OBS HIGH 50: CPT | Performed by: HOSPITALIST

## 2023-11-26 PROCEDURE — 97163 PT EVAL HIGH COMPLEX 45 MIN: CPT

## 2023-11-26 PROCEDURE — 85520 HEPARIN ASSAY: CPT

## 2023-11-26 PROCEDURE — 99232 SBSQ HOSP IP/OBS MODERATE 35: CPT | Performed by: STUDENT IN AN ORGANIZED HEALTH CARE EDUCATION/TRAINING PROGRAM

## 2023-11-26 PROCEDURE — 97530 THERAPEUTIC ACTIVITIES: CPT

## 2023-11-26 RX ADMIN — ACETAMINOPHEN 650 MG: 325 TABLET, FILM COATED ORAL at 12:47

## 2023-11-26 RX ADMIN — CARBOXYMETHYLCELLULOSE SODIUM 1 DROP: 5 SOLUTION/ DROPS OPHTHALMIC at 04:17

## 2023-11-26 RX ADMIN — HEPARIN SODIUM 15 UNITS/KG/HR: 5000 INJECTION, SOLUTION INTRAVENOUS at 10:02

## 2023-11-26 RX ADMIN — SENNOSIDES AND DOCUSATE SODIUM 2 TABLET: 50; 8.6 TABLET ORAL at 06:07

## 2023-11-26 RX ADMIN — SODIUM CHLORIDE 1 G: 1 TABLET ORAL at 17:03

## 2023-11-26 RX ADMIN — OXYCODONE 5 MG: 5 TABLET ORAL at 08:10

## 2023-11-26 RX ADMIN — VALACYCLOVIR HYDROCHLORIDE 1000 MG: 500 TABLET, FILM COATED ORAL at 12:44

## 2023-11-26 RX ADMIN — CARBOXYMETHYLCELLULOSE SODIUM 1 DROP: 5 SOLUTION/ DROPS OPHTHALMIC at 12:44

## 2023-11-26 RX ADMIN — OXYCODONE 5 MG: 5 TABLET ORAL at 16:23

## 2023-11-26 RX ADMIN — VALACYCLOVIR HYDROCHLORIDE 1000 MG: 500 TABLET, FILM COATED ORAL at 06:07

## 2023-11-26 RX ADMIN — PREDNISONE 60 MG: 50 TABLET ORAL at 06:07

## 2023-11-26 RX ADMIN — ATORVASTATIN CALCIUM 80 MG: 80 TABLET, FILM COATED ORAL at 17:03

## 2023-11-26 RX ADMIN — ACETAMINOPHEN 650 MG: 325 TABLET, FILM COATED ORAL at 18:02

## 2023-11-26 RX ADMIN — VALACYCLOVIR HYDROCHLORIDE 1000 MG: 500 TABLET, FILM COATED ORAL at 17:03

## 2023-11-26 RX ADMIN — OXYCODONE 5 MG: 5 TABLET ORAL at 03:25

## 2023-11-26 RX ADMIN — SODIUM CHLORIDE 1 G: 1 TABLET ORAL at 06:09

## 2023-11-26 RX ADMIN — CARBOXYMETHYLCELLULOSE SODIUM 1 DROP: 5 SOLUTION/ DROPS OPHTHALMIC at 06:10

## 2023-11-26 RX ADMIN — MINERAL OIL, PETROLATUM 1 APPLICATION: 425; 568 OINTMENT OPHTHALMIC at 21:01

## 2023-11-26 RX ADMIN — OXYCODONE 5 MG: 5 TABLET ORAL at 21:00

## 2023-11-26 RX ADMIN — ACETAMINOPHEN 650 MG: 325 TABLET, FILM COATED ORAL at 06:25

## 2023-11-26 RX ADMIN — LABETALOL HYDROCHLORIDE 10 MG: 5 INJECTION, SOLUTION INTRAVENOUS at 06:25

## 2023-11-26 RX ADMIN — AMLODIPINE BESYLATE 10 MG: 10 TABLET ORAL at 06:09

## 2023-11-26 ASSESSMENT — PAIN DESCRIPTION - PAIN TYPE
TYPE: ACUTE PAIN

## 2023-11-26 ASSESSMENT — COGNITIVE AND FUNCTIONAL STATUS - GENERAL
MOBILITY SCORE: 21
CLIMB 3 TO 5 STEPS WITH RAILING: A LITTLE
WALKING IN HOSPITAL ROOM: A LITTLE
STANDING UP FROM CHAIR USING ARMS: A LITTLE
SUGGESTED CMS G CODE MODIFIER MOBILITY: CJ

## 2023-11-26 ASSESSMENT — FIBROSIS 4 INDEX: FIB4 SCORE: 0.45

## 2023-11-26 ASSESSMENT — PATIENT HEALTH QUESTIONNAIRE - PHQ9
2. FEELING DOWN, DEPRESSED, IRRITABLE, OR HOPELESS: NOT AT ALL
SUM OF ALL RESPONSES TO PHQ9 QUESTIONS 1 AND 2: 0
1. LITTLE INTEREST OR PLEASURE IN DOING THINGS: NOT AT ALL

## 2023-11-26 ASSESSMENT — ENCOUNTER SYMPTOMS
DOUBLE VISION: 1
SHORTNESS OF BREATH: 0
HEADACHES: 1
CHILLS: 0
FEVER: 0
FOCAL WEAKNESS: 1

## 2023-11-26 ASSESSMENT — GAIT ASSESSMENTS
DEVIATION: BRADYKINETIC
GAIT LEVEL OF ASSIST: STANDBY ASSIST
DISTANCE (FEET): 6

## 2023-11-26 NOTE — PROGRESS NOTES
Hospital Medicine Daily Progress Note    Date of Service  11/26/2023    Chief Complaint  Khalif Wilson is a 58 y.o. male admitted 11/23/2023 with headache    Hospital Course  58-year-old male with history of dyslipidemia presented on 11/23/2023 with 1 week history of headache for which she was evaluated at outside hospital diagnosed with a viral syndrome and discharged.  The patient returned with worsening headache associated with nausea vomiting and right-sided facial droop.  CTA demonstrated extensive dural venous sinus thrombosis and the patient was transferred to our facility for higher level of care.    Interval Problem Update    Patient with increased headache this morning and change in vision mild diplopia when looking at certain angle  I ordered stat head CT and reviewed neg for acute bleed  I reviewed BMP and CBC  On heparin drip Xa 0.6  Afebrile  I discussed with neurology continue anticoagulation with heparin drip and close clinical monitoring  Updated patient on CT results and plan of care and discussed with wife at bedside    Total time spent today examining patient ordering and reviewing CT reviewing labs and discussing with consultants 55 minutes      I have discussed this patient's plan of care and discharge plan at IDT rounds today with Case Management, Nursing, Nursing leadership, and other members of the IDT team.    Consultants/Specialty  neurology    Code Status  Full Code    Disposition  The patient is not medically cleared for discharge to home or a post-acute facility.  Anticipate discharge to: home with close outpatient follow-up    I have placed the appropriate orders for post-discharge needs.    Review of Systems  Review of Systems   Constitutional:  Negative for chills and fever.   Eyes:  Positive for double vision.   Respiratory:  Negative for shortness of breath.    Cardiovascular:  Negative for chest pain.   Neurological:  Positive for focal weakness and headaches.   All other systems  reviewed and are negative.       Physical Exam  Temp:  [36.4 °C (97.6 °F)-36.8 °C (98.2 °F)] 36.8 °C (98.2 °F)  Pulse:  [] 60  Resp:  [17-43] 22  BP: (119-160)/(63-97) 138/81  SpO2:  [92 %-97 %] 93 %    Physical Exam  Vitals and nursing note reviewed.   Constitutional:       Appearance: He is well-developed. He is not diaphoretic.   HENT:      Head: Normocephalic and atraumatic.      Mouth/Throat:      Pharynx: No oropharyngeal exudate.   Eyes:      General: No scleral icterus.        Right eye: No discharge.         Left eye: No discharge.      Conjunctiva/sclera: Conjunctivae normal.      Pupils: Pupils are equal, round, and reactive to light.   Neck:      Vascular: No JVD.      Trachea: No tracheal deviation.   Cardiovascular:      Rate and Rhythm: Normal rate and regular rhythm.      Heart sounds: No murmur heard.     No friction rub. No gallop.   Pulmonary:      Effort: Pulmonary effort is normal. No respiratory distress.      Breath sounds: Normal breath sounds. No stridor. No wheezing.   Chest:      Chest wall: No tenderness.   Abdominal:      General: Bowel sounds are normal. There is no distension.      Palpations: Abdomen is soft.      Tenderness: There is no abdominal tenderness. There is no rebound.   Musculoskeletal:         General: No tenderness.      Cervical back: Neck supple.   Skin:     General: Skin is warm and dry.      Nails: There is no clubbing.   Neurological:      Mental Status: He is alert and oriented to person, place, and time.      Cranial Nerves: Cranial nerve deficit present.      Motor: No abnormal muscle tone.      Comments: Right facial droop   Psychiatric:         Behavior: Behavior normal.         Fluids    Intake/Output Summary (Last 24 hours) at 11/26/2023 1003  Last data filed at 11/26/2023 1001  Gross per 24 hour   Intake 891.85 ml   Output 1525 ml   Net -633.15 ml         Laboratory  Recent Labs     11/24/23  0547 11/25/23  0418 11/26/23  0415   WBC 8.4 11.0* 15.8*    RBC 4.95 5.65 5.66   HEMOGLOBIN 16.0 17.6 17.8   HEMATOCRIT 43.9 49.2 50.7   MCV 88.7 87.1 89.6   MCH 32.3 31.2 31.4   MCHC 36.4 35.8 35.1   RDW 37.4 37.0 37.7   PLATELETCT 278 323 328   MPV 9.4 9.1 9.1       Recent Labs     11/24/23  0547 11/25/23  0418 11/26/23  0415   SODIUM 133* 131* 135   POTASSIUM 3.9 4.1 3.9   CHLORIDE 96 98 99   CO2 25 22 23   GLUCOSE 133* 143* 122*   BUN 10 11 16   CREATININE 0.67 0.60 0.71   CALCIUM 8.4* 9.4 9.1       Recent Labs     11/23/23 2241   APTT 67.6*   INR 1.11           Recent Labs     11/23/23 2241 11/25/23 0418   TRIGLYCERIDE 59 73   HDL 42 48   * 113*         Imaging  CT-HEAD W/O   Final Result         1. No evidence of acute cerebral infarction, hemorrhage or mass lesion.      2. Increased attenuation in the superior sagittal sinus and transverse sinuses consistent with dural venous sinus thrombosis.         MR-BRAIN-WITH & W/O   Final Result      1.  Patchy area of T2 and FLAIR hyperintensity in the left posterior frontal deep white matter with prominent venous flow voids in this vicinity. 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   2.  Dural venous sinus thrombosis superior sagittal sinus, bilateral transverse sinuses, left sigmoid sinus.      MR-VENOGRAM (MRV) HEAD   Final Result   Addendum (preliminary) 1 of 1   ADDENDUM/correction on the technique portion of the report: (Exam was not    and MRA [place] but rather a cerebral MR venogram). See    correction below.      TECHNIQUE/EXAM DESCRIPTION:   MRV - Cerebral Magnetic Resonance Venogram.      The study was performed on a Corrine 3.0 Cookie MRI scanner.   Cerebral Magnetic Resonance Venography (MRV) was performed with 2D    time-of-flight (TOF) technique with acquisitions obtained in the axial,    coronal, and sagittal planes. Images are reviewed at the PACS or    Robert-Recon workstations as source images in all 3    planes as well as maximum intensity ray  projection (MIP) multiplanar 3D    reconstructions.      Final      1.  Study is positive for dural venous sinus thrombosis. Superior sagittal sinus thrombosis, bilateral transverse sinus thrombosis, left sigmoid sinus thrombosis.   2.  Findings are concordant with CT-CTA of the head outside films.           Assessment/Plan  * Cerebral venous thrombosis- (present on admission)  Assessment & Plan  Patient with 1 week history of headaches, worsening with nausea and vomiting, CTA at outside hospital demonstrating cerebral venous thrombosis,     I discussed with neurology  Continue heparin drip and consider transitioning to DOAC tomorrow if clinically stable  Every 2 hours neurochecks stat CT head for any changes in neurostatus  Patient reports being up-to-date on routine cancer screening.    Outpatient follow-up with neurology and hematology and defer hypercoagulable workup to outpatient setting since he is currently on heparin drip    HTN (hypertension)  Assessment & Plan  Goal -1 40  I increased amlodipine 10 mg  Monitor blood pressure    Hyponatremia  Assessment & Plan  Sodium 131  I ordered salt tablets  Monitor electrolytes I ordered repeat BMP    Bell's palsy- (present on admission)  Assessment & Plan  MRI negative for acute stroke  Continue prednisone and Valtrex  Artificial tears      Hypokalemia- (present on admission)  Assessment & Plan  Repleted    Nausea and vomiting- (present on admission)  Assessment & Plan  Resolved           VTE prophylaxis:    therapeutic anticoagulation with weight-based heparin gtt, pharmacy to adjust PRN      I have performed a physical exam and reviewed and updated ROS and Plan today (11/26/2023). In review of yesterday's note (11/25/2023), there are no changes except as documented above.

## 2023-11-26 NOTE — PROGRESS NOTES
Neurology Progress Note  Neurohospitalist Service, [facility] Neurosciences    Referring Physician: Mark Garcia M.D.      Interval History: Patient seen and examined this AM. Patient yesterday ambulated in unit and afterwards began to have worsening headache. He also describes vision changes which were exacerbated by his movements. As his symptoms failed to improve throughout the night the primary team ordered a CTH which was completed this AM. This demonstrated no significant changes no no signs of hemorrhage. Upon examination this AM, patient reports improvement in his headache. He describes visual change that has been present since symptoms started a week prior. Objects seem to have a distorted shape upon certain directions of gaze, which improves when shutting one eye. Physical exam failed to demonstrate georgina double vision with horizontal or vertical gaze. No other acute complaints or concerns.     Review of systems: In addition to what is detailed in the HPI and/or updated in the interval history, all other systems reviewed and are negative.    Past Medical History, Past Surgical History and Social History reviewed and unchanged from prior    Medications:    Current Facility-Administered Medications:     amLODIPine (Norvasc) tablet 10 mg, 10 mg, Oral, Q DAY, Mark Garcia M.D., 10 mg at 11/26/23 0609    sodium chloride (Salt) tablet 1 g, 1 g, Oral, BID, Mark Garcia M.D., 1 g at 11/26/23 0609    senna-docusate (Pericolace Or Senokot S) 8.6-50 MG per tablet 2 Tablet, 2 Tablet, Oral, BID, 2 Tablet at 11/26/23 0607 **AND** polyethylene glycol/lytes (Miralax) PACKET 1 Packet, 1 Packet, Oral, QDAY PRN, 1 Packet at 11/24/23 1709 **AND** magnesium hydroxide (Milk Of Magnesia) suspension 30 mL, 30 mL, Oral, QDAY PRN **AND** bisacodyl (Dulcolax) suppository 10 mg, 10 mg, Rectal, QDAY PRN, Yadiel Kumar M.D.    Respiratory Therapy Consult, , Nebulization, Continuous RT, Cutter J  "GENET Kumar    acetaminophen (Tylenol) tablet 650 mg, 650 mg, Oral, Q6HRS PRN, Yadiel Kumar M.D., 650 mg at 11/26/23 0625    atorvastatin (Lipitor) tablet 80 mg, 80 mg, Oral, Q EVENING, Yadiel Kumar M.D., 80 mg at 11/25/23 1654    NS (Bolus) 0.9 % infusion 500 mL, 500 mL, Intravenous, Once PRN, Yadiel Kumar M.D.    ondansetron (Zofran) syringe/vial injection 4 mg, 4 mg, Intravenous, Q4HRS PRN, Yadiel Kumar M.D.    morphine 4 MG/ML injection 4 mg, 4 mg, Intravenous, Q4HRS PRN, Yadiel Kumar M.D., 4 mg at 11/25/23 0736    carboxymethylcellulose (Refresh Tears) 0.5 % ophthalmic drops 1 Drop, 1 Drop, Both Eyes, BID PRN, Yadiel Kumar M.D., 1 Drop at 11/26/23 0417    valACYclovir (Valtrex) caplet 1,000 mg, 1,000 mg, Oral, TID, Yadiel Kumar M.D., 1,000 mg at 11/26/23 0607    oxyCODONE immediate-release (Roxicodone) tablet 5 mg, 5 mg, Oral, Q4HRS PRN, Mark Garcia M.D., 5 mg at 11/26/23 0810    heparin infusion 25,000 units in 500 mL 0.45% NACL, 0-30 Units/kg/hr (Order-Specific), Intravenous, Continuous, Mark Garcia M.D., Last Rate: 25.9 mL/hr at 11/26/23 1002, 15 Units/kg/hr at 11/26/23 1002    heparin injection 3,400 Units, 40 Units/kg (Order-Specific), Intravenous, PRN, Mark Garcia M.D.    predniSONE (Deltasone) tablet 60 mg, 60 mg, Oral, DAILY, Mark Garcia M.D., 60 mg at 11/26/23 0607    artificial tears (Eye Lubricant) ophth ointment 1 Application, 1 Application, Right Eye, Nightly, Mark Garcia M.D., 1 Application at 11/25/23 2154    carboxymethylcellulose (Refresh Tears) 0.5 % ophthalmic drops 1 Drop, 1 Drop, Right Eye, BID, Mark Garcia M.D., 1 Drop at 11/26/23 0610    labetalol (Normodyne/Trandate) injection 10 mg, 10 mg, Intravenous, Q10 MIN PRN, Cami Loyd M.D., 10 mg at 11/26/23 0625    Physical Examination:   /76   Pulse 68   Temp 36.8 °C (98.2 °F) (Temporal)   Resp (!) 29   Ht 1.727 m (5' 8\")   Wt 82.3 " kg (181 lb 7 oz)   SpO2 95%   BMI 27.59 kg/m²     NEUROLOGICAL EXAM:     Neuro exam completed today and demonstrates no changes. 11/26/2023    MENTAL STATUS: Awake, alert and oriented to person, place, time and situation, attention and memory intact  LANG/SPEECH: Naming and repetition intact, fluent speech, follows simple, two-step, multi-step and complex commands.     CRANIAL NERVES:  II: Pupils equal and reactive, no VF deficits  III, IV, VI: EOM intact, no gaze preference or deviation, no nystagmus.  V: normal sensation in V1, V2, and V3 segments bilaterally  VII: Right facial asymmetry including right frontalis weakness right orbicularis oculi weakness subtle nasolabial fold loss and asymmetric smile with right facial droop.  VIII: normal hearing to speech  IX, X: normal palatal elevation, no uvular deviation  XI: 5/5 head turn and 5/5 shoulder shrug bilaterally  XII: midline tongue protrusion     MOTOR: Antigravity movement in bilateral upper and lower extremities. Full and symmetric power in proximal and distal muscle groups in bilateral upper and lower extremities     REFLEXES: bilateral flexor plantar response, no clonus  SENSORY: Normal to fine touch in all extremities  COORD: Normal finger to nose, no tremor, no dysmetria  GAIT: Deferred    Objective Data:    Labs:  Lab Results   Component Value Date/Time    PROTHROMBTM 14.4 11/23/2023 10:41 PM    INR 1.11 11/23/2023 10:41 PM      Lab Results   Component Value Date/Time    WBC 15.8 (H) 11/26/2023 04:15 AM    RBC 5.66 11/26/2023 04:15 AM    HEMOGLOBIN 17.8 11/26/2023 04:15 AM    HEMATOCRIT 50.7 11/26/2023 04:15 AM    MCV 89.6 11/26/2023 04:15 AM    MCH 31.4 11/26/2023 04:15 AM    MCHC 35.1 11/26/2023 04:15 AM    MPV 9.1 11/26/2023 04:15 AM    NEUTSPOLYS 68.70 11/23/2023 10:41 PM    LYMPHOCYTES 19.70 (L) 11/23/2023 10:41 PM    MONOCYTES 9.70 11/23/2023 10:41 PM    EOSINOPHILS 1.10 11/23/2023 10:41 PM    BASOPHILS 0.50 11/23/2023 10:41 PM      Lab Results    Component Value Date/Time    SODIUM 135 11/26/2023 04:15 AM    POTASSIUM 3.9 11/26/2023 04:15 AM    CHLORIDE 99 11/26/2023 04:15 AM    CO2 23 11/26/2023 04:15 AM    GLUCOSE 122 (H) 11/26/2023 04:15 AM    BUN 16 11/26/2023 04:15 AM    CREATININE 0.71 11/26/2023 04:15 AM      Lab Results   Component Value Date/Time    CHOLSTRLTOT 176 11/25/2023 04:18 AM     (H) 11/25/2023 04:18 AM    HDL 48 11/25/2023 04:18 AM    TRIGLYCERIDE 73 11/25/2023 04:18 AM       Lab Results   Component Value Date/Time    ALKPHOSPHAT 84 11/25/2023 04:18 AM    ASTSGOT 13 11/25/2023 04:18 AM    ALTSGPT 25 11/25/2023 04:18 AM    TBILIRUBIN 0.8 11/25/2023 04:18 AM        Imaging/Testing:    I interpreted and/or reviewed the patient's neuroimaging    CT-HEAD W/O   Final Result         1. No evidence of acute cerebral infarction, hemorrhage or mass lesion.      2. Increased attenuation in the superior sagittal sinus and transverse sinuses consistent with dural venous sinus thrombosis.         MR-BRAIN-WITH & W/O   Final Result      1.  Patchy area of T2 and FLAIR hyperintensity in the left posterior frontal deep white matter with prominent venous flow voids in this vicinity. 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   2.  Dural venous sinus thrombosis superior sagittal sinus, bilateral transverse sinuses, left sigmoid sinus.      MR-VENOGRAM (MRV) HEAD   Final Result   Addendum (preliminary) 1 of 1   ADDENDUM/correction on the technique portion of the report: (Exam was not    and MRA [place] but rather a cerebral MR venogram). See    correction below.      TECHNIQUE/EXAM DESCRIPTION:   MRV - Cerebral Magnetic Resonance Venogram.      The study was performed on a Corrine 3.0 Cookie MRI scanner.   Cerebral Magnetic Resonance Venography (MRV) was performed with 2D    time-of-flight (TOF) technique with acquisitions obtained in the axial,    coronal, and sagittal planes. Images are reviewed at  the PACS or    Robert-Recon workstations as source images in all 3    planes as well as maximum intensity ray projection (MIP) multiplanar 3D    reconstructions.      Final      1.  Study is positive for dural venous sinus thrombosis. Superior sagittal sinus thrombosis, bilateral transverse sinus thrombosis, left sigmoid sinus thrombosis.   2.  Findings are concordant with CT-CTA of the head outside films.          Assessment and Plan:  Khalif Wilson is a 58 y.o. male with history of hyperlipidemia who presented as a transfer from [facility] after finding of  extensive dural venous sinus thrombosis on outside CTA.  Patient begun on heparin drip and transferred to [facility] for higher level of care.  On physical exam patient demonstrates right lower motor neuron weakness pattern consistent with Bell's palsy.  Based on patient's pattern of weakness I have low suspicion for an acute ischemic infarct however follow-up MRI will clarify.  It is possible that a viral etiology post explains patient's dural venous sinus thrombosis secondary to acute inflammation and his acute facial nerve weakness.  MRI brain and MRI venogram redemonstrate extensive dural venous sinus thrombosis in superior sagittal sinus, left transverse sinus and left sigmoid sinus. No evidence of acute infarction. There is evidence of left basal ganglia developmental venous anomaly with evidence of overt bleeding but does have associated hazy T2 hyperintense signal. This likely has no current clinical significance. Current plan will be to initiate oral anticoagulation beginning tomorrow but would like to observe some resolution of underlying headache. Patient will be treated for a total of 3-6 months. Can consider outpatient hypercoagulable work up but acutely would be of limited value secondary to IV heparin use and likely inflammatory state.     11/26/2023 Day Update: Repeat head CT this AM without significant changes,  no new signs of hydrocephalus or hemorrhage. Patient reports slightly improved headache, from 7/10 to 4/10 although he is receiving pain medication. Patient reports worsening headache after ambulation yesterday. My concern at this time would be for continued headache from changes in ICP pressure with incomplete resolution of dural venous sinus. Will continue with IV heparin and plan for potential switch to oral AC tomorrow.      Problem list:  -- Dural venous sinus thrombosis        Plan:  -- Admit to IMCU w/ q2 neurochecks  -- Systolic blood pressure goal should be normotension, -140  -- Continue heparin gtt DVT protocol  -- STAT CTH for any acute neurologic changes  -- Continuing IV heparin; tentative plan to switch to Eliquis 5 mg BID tomorrow pending clinical course  -- Once transitioned to oral anticoagulation patient will be treated for 3 to 6 months and have follow-up as an outpatient basis for repeat vascular imaging  -- Patient will be on therapeutic heparin gtt; no need for further mechanical or chemo DVT ppx  -- Patient on Prednisone and Valtrex for Rt-sided Bell's palsy; Rx for total of x7 days of steroids  -- PT/OT/SLP  -- Recommend vascular neurology follow up for DVST on discharge  -- Recommend hematology follow up on discharge for hypercoagulable evaluation/work up     I have performed a physical exam and reviewed and updated ROS and Plan today (11/26/2023).     Herman Aquino III, MD  Vascular Neurology, [facility]

## 2023-11-26 NOTE — PROGRESS NOTES
Hospital Medicine Daily Progress Note    Date of Service  11/25/2023    Chief Complaint  Khalif Wilson is a 58 y.o. male admitted 11/23/2023 with headache    Hospital Course  58-year-old male with history of dyslipidemia presented on 11/23/2023 with 1 week history of headache for which she was evaluated at outside hospital diagnosed with a viral syndrome and discharged.  The patient returned with worsening headache associated with nausea vomiting and right-sided facial droop.  CTA demonstrated extensive dural venous sinus thrombosis and the patient was transferred to our facility for higher level of care.    Interval Problem Update    Patient complains of mild to moderate headache  He is afebrile on room air  Reviewed CMP sodium 131 glucose 143 LFTs normal  Reviewed CBC WBC 11 hemoglobin 17.6  Remains on heparin drip Xa level 0.43  I reviewed MRI and MRV of brain and discussed with neurology Dr. Aquino    I have discussed this patient's plan of care and discharge plan at IDT rounds today with Case Management, Nursing, Nursing leadership, and other members of the IDT team.    Consultants/Specialty  neurology    Code Status  Full Code    Disposition  Medically Cleared  I have placed the appropriate orders for post-discharge needs.    Review of Systems  Review of Systems   Constitutional:  Negative for fever.   Neurological:  Positive for focal weakness (Facial palsy) and headaches.        Physical Exam  Temp:  [36.1 °C (96.9 °F)-36.8 °C (98.2 °F)] 36.1 °C (96.9 °F)  Pulse:  [55-89] 67  Resp:  [17-34] 30  BP: (119-169)/(60-97) 119/71  SpO2:  [92 %-97 %] 94 %    Physical Exam  Vitals and nursing note reviewed.   Constitutional:       Appearance: He is well-developed. He is not diaphoretic.   HENT:      Head: Normocephalic and atraumatic.      Mouth/Throat:      Pharynx: No oropharyngeal exudate.   Eyes:      General: No scleral icterus.        Right eye: No discharge.         Left eye: No discharge.       Conjunctiva/sclera: Conjunctivae normal.      Pupils: Pupils are equal, round, and reactive to light.   Neck:      Vascular: No JVD.      Trachea: No tracheal deviation.   Cardiovascular:      Rate and Rhythm: Normal rate and regular rhythm.      Heart sounds: No murmur heard.     No friction rub. No gallop.   Pulmonary:      Effort: Pulmonary effort is normal. No respiratory distress.      Breath sounds: Normal breath sounds. No stridor. No wheezing.   Chest:      Chest wall: No tenderness.   Abdominal:      General: Bowel sounds are normal. There is no distension.      Palpations: Abdomen is soft.      Tenderness: There is no abdominal tenderness. There is no rebound.   Musculoskeletal:         General: No tenderness.      Cervical back: Neck supple.   Skin:     General: Skin is warm and dry.      Nails: There is no clubbing.   Neurological:      Mental Status: He is alert and oriented to person, place, and time.      Cranial Nerves: Cranial nerve deficit present.      Motor: No abnormal muscle tone.      Comments: Right facial droop   Psychiatric:         Behavior: Behavior normal.         Fluids    Intake/Output Summary (Last 24 hours) at 11/25/2023 1642  Last data filed at 11/25/2023 1000  Gross per 24 hour   Intake 1611.35 ml   Output 1650 ml   Net -38.65 ml         Laboratory  Recent Labs     11/23/23 2241 11/24/23  0547 11/25/23 0418   WBC 10.4 8.4 11.0*   RBC 5.72 4.95 5.65   HEMOGLOBIN 18.2* 16.0 17.6   HEMATOCRIT 49.8 43.9 49.2   MCV 87.1 88.7 87.1   MCH 31.8 32.3 31.2   MCHC 36.5 36.4 35.8   RDW 37.3 37.4 37.0   PLATELETCT 301 278 323   MPV 9.1 9.4 9.1       Recent Labs     11/23/23 2241 11/24/23  0547 11/25/23 0418   SODIUM 135 133* 131*   POTASSIUM 3.5* 3.9 4.1   CHLORIDE 98 96 98   CO2 22 25 22   GLUCOSE 114* 133* 143*   BUN 12 10 11   CREATININE 0.80 0.67 0.60   CALCIUM 8.8 8.4* 9.4       Recent Labs     11/23/23 2241   APTT 67.6*   INR 1.11           Recent Labs     11/23/23 2241  11/25/23  0418   TRIGLYCERIDE 59 73   HDL 42 48   * 113*         Imaging  MR-BRAIN-WITH & W/O   Final Result      1.  Patchy area of T2 and FLAIR hyperintensity in the left posterior frontal deep white matter with prominent venous flow voids in this vicinity. 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   2.  Dural venous sinus thrombosis superior sagittal sinus, bilateral transverse sinuses, left sigmoid sinus.      MR-VENOGRAM (MRV) HEAD   Final Result   Addendum (preliminary) 1 of 1   ADDENDUM/correction on the technique portion of the report: (Exam was not    and MRA [place] but rather a cerebral MR venogram). See    correction below.      TECHNIQUE/EXAM DESCRIPTION:   MRV - Cerebral Magnetic Resonance Venogram.      The study was performed on a Corrine 3.0 Cookie MRI scanner.   Cerebral Magnetic Resonance Venography (MRV) was performed with 2D    time-of-flight (TOF) technique with acquisitions obtained in the axial,    coronal, and sagittal planes. Images are reviewed at the PACS or    Robert-Recon workstations as source images in all 3    planes as well as maximum intensity ray projection (MIP) multiplanar 3D    reconstructions.      Final      1.  Study is positive for dural venous sinus thrombosis. Superior sagittal sinus thrombosis, bilateral transverse sinus thrombosis, left sigmoid sinus thrombosis.   2.  Findings are concordant with CT-CTA of the head outside films.           Assessment/Plan  * Cerebral venous thrombosis- (present on admission)  Assessment & Plan  Patient with 1 week history of headaches, worsening with nausea and vomiting, CTA at outside hospital demonstrating cerebral venous thrombosis,     I discussed with neurology  Continue heparin drip and consider transitioning to DOAC tomorrow if clinically stable  Every 2 hours neurochecks stat CT head for any changes in neurostatus  Patient reports being up-to-date on routine cancer  screening.    Outpatient follow-up with neurology and hematology and defer hypercoagulable workup to outpatient setting since he is currently on heparin drip    Hyponatremia  Assessment & Plan  Sodium 131  I ordered salt tablets  Monitor electrolytes I ordered repeat BMP    Bell's palsy- (present on admission)  Assessment & Plan  MRI negative for acute stroke  Continue prednisone and Valtrex  Artificial tears      Hypokalemia- (present on admission)  Assessment & Plan  Repleted    Nausea and vomiting- (present on admission)  Assessment & Plan  Resolved         >Patient is critically ill.   >The patient is having : Central venous thrombosis  >The vital organ system that is effected is the: CNS  >If untreated there is a high chance of deterioration into: Intracranial hemorrhage and death  >The critical care that I am providing today is: Heparin drip, electrolyte management  >The critical care that has been undertaken is medically complex.   >There has been no overlap in critical care time.   Critical care time not including procedures, no overlap: 40 minutes      VTE prophylaxis:    therapeutic anticoagulation with weight-based heparin gtt, pharmacy to adjust PRN      I have performed a physical exam and reviewed and updated ROS and Plan today (11/25/2023). In review of yesterday's note (11/24/2023), there are no changes except as documented above.

## 2023-11-26 NOTE — CARE PLAN
Problem: Pain - Standard  Goal: Alleviation of pain or a reduction in pain to the patient’s comfort goal  Outcome: Not Progressing     Problem: Mobility - Stroke  Goal: Patient's capacity to carry out activities will improve  Outcome: Progressing   The patient is Watcher - Medium risk of patient condition declining or worsening    Shift Goals  Clinical Goals: -140, Q2 neuro, pain control  Patient Goals: pain control, rest, get better  Family Goals: updates    Progress made toward(s) clinical / shift goals:  patient ambulated around NSG unit this shift with walker and standby assist for IV pole.    Patient is not progressing towards the following goals:Pain is ongoing headache and facial pain, medicated with Tylenol and oxycodone this shift for pain management. Also required IV Labetalol x 2 this shift for BP management, Goal -140      Problem: Pain - Standard  Goal: Alleviation of pain or a reduction in pain to the patient’s comfort goal  Outcome: Not Progressing

## 2023-11-27 LAB
ANION GAP SERPL CALC-SCNC: 11 MMOL/L (ref 7–16)
BUN SERPL-MCNC: 14 MG/DL (ref 8–22)
CALCIUM SERPL-MCNC: 9 MG/DL (ref 8.5–10.5)
CHLORIDE SERPL-SCNC: 101 MMOL/L (ref 96–112)
CO2 SERPL-SCNC: 24 MMOL/L (ref 20–33)
CREAT SERPL-MCNC: 0.61 MG/DL (ref 0.5–1.4)
ERYTHROCYTE [DISTWIDTH] IN BLOOD BY AUTOMATED COUNT: 37.7 FL (ref 35.9–50)
GFR SERPLBLD CREATININE-BSD FMLA CKD-EPI: 112 ML/MIN/1.73 M 2
GLUCOSE SERPL-MCNC: 103 MG/DL (ref 65–99)
HCT VFR BLD AUTO: 50.1 % (ref 42–52)
HGB BLD-MCNC: 17.9 G/DL (ref 14–18)
MCH RBC QN AUTO: 31.4 PG (ref 27–33)
MCHC RBC AUTO-ENTMCNC: 35.7 G/DL (ref 32.3–36.5)
MCV RBC AUTO: 87.9 FL (ref 81.4–97.8)
PLATELET # BLD AUTO: 309 K/UL (ref 164–446)
PMV BLD AUTO: 9 FL (ref 9–12.9)
POTASSIUM SERPL-SCNC: 3.9 MMOL/L (ref 3.6–5.5)
RBC # BLD AUTO: 5.7 M/UL (ref 4.7–6.1)
SODIUM SERPL-SCNC: 136 MMOL/L (ref 135–145)
UFH PPP CHRO-ACNC: 0.59 IU/ML
WBC # BLD AUTO: 11.8 K/UL (ref 4.8–10.8)

## 2023-11-27 PROCEDURE — 97167 OT EVAL HIGH COMPLEX 60 MIN: CPT

## 2023-11-27 PROCEDURE — 85027 COMPLETE CBC AUTOMATED: CPT

## 2023-11-27 PROCEDURE — 80048 BASIC METABOLIC PNL TOTAL CA: CPT

## 2023-11-27 PROCEDURE — 99233 SBSQ HOSP IP/OBS HIGH 50: CPT | Performed by: PSYCHIATRY & NEUROLOGY

## 2023-11-27 PROCEDURE — 700111 HCHG RX REV CODE 636 W/ 250 OVERRIDE (IP): Performed by: HOSPITALIST

## 2023-11-27 PROCEDURE — 770001 HCHG ROOM/CARE - MED/SURG/GYN PRIV*

## 2023-11-27 PROCEDURE — 99232 SBSQ HOSP IP/OBS MODERATE 35: CPT | Performed by: HOSPITALIST

## 2023-11-27 PROCEDURE — A9270 NON-COVERED ITEM OR SERVICE: HCPCS | Performed by: STUDENT IN AN ORGANIZED HEALTH CARE EDUCATION/TRAINING PROGRAM

## 2023-11-27 PROCEDURE — A9270 NON-COVERED ITEM OR SERVICE: HCPCS | Performed by: HOSPITALIST

## 2023-11-27 PROCEDURE — 85520 HEPARIN ASSAY: CPT

## 2023-11-27 PROCEDURE — 700102 HCHG RX REV CODE 250 W/ 637 OVERRIDE(OP): Performed by: STUDENT IN AN ORGANIZED HEALTH CARE EDUCATION/TRAINING PROGRAM

## 2023-11-27 PROCEDURE — 700102 HCHG RX REV CODE 250 W/ 637 OVERRIDE(OP): Performed by: HOSPITALIST

## 2023-11-27 RX ORDER — LOSARTAN POTASSIUM 50 MG/1
25 TABLET ORAL
Status: DISCONTINUED | OUTPATIENT
Start: 2023-11-27 | End: 2023-11-28 | Stop reason: HOSPADM

## 2023-11-27 RX ORDER — CARBOXYMETHYLCELLULOSE SODIUM 5 MG/ML
1 SOLUTION/ DROPS OPHTHALMIC
Status: DISCONTINUED | OUTPATIENT
Start: 2023-11-27 | End: 2023-11-28 | Stop reason: HOSPADM

## 2023-11-27 RX ORDER — CARBOXYMETHYLCELLULOSE SODIUM 5 MG/ML
1 SOLUTION/ DROPS OPHTHALMIC PRN
Status: DISCONTINUED | OUTPATIENT
Start: 2023-11-27 | End: 2023-11-27

## 2023-11-27 RX ORDER — CARBOXYMETHYLCELLULOSE SODIUM 5 MG/ML
1 SOLUTION/ DROPS OPHTHALMIC
Status: DISCONTINUED | OUTPATIENT
Start: 2023-11-27 | End: 2023-11-27

## 2023-11-27 RX ADMIN — CARBOXYMETHYLCELLULOSE SODIUM 1 DROP: 5 SOLUTION/ DROPS OPHTHALMIC at 14:04

## 2023-11-27 RX ADMIN — MINERAL OIL, PETROLATUM 1 APPLICATION: 425; 568 OINTMENT OPHTHALMIC at 20:58

## 2023-11-27 RX ADMIN — OXYCODONE 5 MG: 5 TABLET ORAL at 04:12

## 2023-11-27 RX ADMIN — PREDNISONE 60 MG: 50 TABLET ORAL at 05:23

## 2023-11-27 RX ADMIN — AMLODIPINE BESYLATE 10 MG: 10 TABLET ORAL at 05:23

## 2023-11-27 RX ADMIN — VALACYCLOVIR HYDROCHLORIDE 1000 MG: 500 TABLET, FILM COATED ORAL at 05:24

## 2023-11-27 RX ADMIN — SODIUM CHLORIDE 1 G: 1 TABLET ORAL at 05:23

## 2023-11-27 RX ADMIN — CARBOXYMETHYLCELLULOSE SODIUM 1 DROP: 5 SOLUTION/ DROPS OPHTHALMIC at 11:50

## 2023-11-27 RX ADMIN — ATORVASTATIN CALCIUM 80 MG: 80 TABLET, FILM COATED ORAL at 16:34

## 2023-11-27 RX ADMIN — LOSARTAN POTASSIUM 25 MG: 50 TABLET, FILM COATED ORAL at 11:50

## 2023-11-27 RX ADMIN — CARBOXYMETHYLCELLULOSE SODIUM 1 DROP: 5 SOLUTION/ DROPS OPHTHALMIC at 16:35

## 2023-11-27 RX ADMIN — HEPARIN SODIUM 15 UNITS/KG/HR: 5000 INJECTION, SOLUTION INTRAVENOUS at 04:39

## 2023-11-27 RX ADMIN — CARBOXYMETHYLCELLULOSE SODIUM 1 DROP: 5 SOLUTION/ DROPS OPHTHALMIC at 20:58

## 2023-11-27 RX ADMIN — VALACYCLOVIR HYDROCHLORIDE 1000 MG: 500 TABLET, FILM COATED ORAL at 11:49

## 2023-11-27 RX ADMIN — ACETAMINOPHEN 650 MG: 325 TABLET, FILM COATED ORAL at 18:08

## 2023-11-27 RX ADMIN — APIXABAN 5 MG: 5 TABLET, FILM COATED ORAL at 09:23

## 2023-11-27 RX ADMIN — OXYCODONE 5 MG: 5 TABLET ORAL at 15:44

## 2023-11-27 RX ADMIN — ACETAMINOPHEN 650 MG: 325 TABLET, FILM COATED ORAL at 00:05

## 2023-11-27 RX ADMIN — CARBOXYMETHYLCELLULOSE SODIUM 1 DROP: 5 SOLUTION/ DROPS OPHTHALMIC at 05:25

## 2023-11-27 RX ADMIN — CARBOXYMETHYLCELLULOSE SODIUM 1 DROP: 5 SOLUTION/ DROPS OPHTHALMIC at 08:43

## 2023-11-27 RX ADMIN — ACETAMINOPHEN 650 MG: 325 TABLET, FILM COATED ORAL at 10:16

## 2023-11-27 RX ADMIN — VALACYCLOVIR HYDROCHLORIDE 1000 MG: 500 TABLET, FILM COATED ORAL at 16:34

## 2023-11-27 RX ADMIN — APIXABAN 5 MG: 5 TABLET, FILM COATED ORAL at 16:35

## 2023-11-27 RX ADMIN — SENNOSIDES AND DOCUSATE SODIUM 2 TABLET: 50; 8.6 TABLET ORAL at 16:34

## 2023-11-27 ASSESSMENT — COGNITIVE AND FUNCTIONAL STATUS - GENERAL
DAILY ACTIVITIY SCORE: 24
SUGGESTED CMS G CODE MODIFIER DAILY ACTIVITY: CH

## 2023-11-27 ASSESSMENT — ENCOUNTER SYMPTOMS
FOCAL WEAKNESS: 1
DOUBLE VISION: 1
FEVER: 0

## 2023-11-27 ASSESSMENT — ACTIVITIES OF DAILY LIVING (ADL): TOILETING: INDEPENDENT

## 2023-11-27 ASSESSMENT — PAIN DESCRIPTION - PAIN TYPE
TYPE: ACUTE PAIN

## 2023-11-27 ASSESSMENT — FIBROSIS 4 INDEX: FIB4 SCORE: 0.44

## 2023-11-27 NOTE — THERAPY
Physical Therapy   Initial Evaluation     Patient Name: Khalif Wilson  Age:  58 y.o., Sex:  male  Medical Record #: [id number]  Today's Date: 11/26/2023     Precautions  Precautions: Fall Risk  Comments: strict SBP goal of 100-140    Assessment  Patient is 58 y.o. male with 1 week hx of R sided headache, N&V, right sided facial droop, weakness of right side eyelid. MRI positive for dural venous sinus thrombosis. Superior sagittal sinus thrombosis, bilateral transverse sinus thrombosis, left sigmoid sinus thrombosis. No evidence of acute infarction. Dx'd with Bell's Palsy, Dural venous sinus thrombosis.    Pt with primary complaint of vision impairment, prefers to close one eye or both eyes. Pt stated that the double like vision increases his headache. Pt with R sided facial droop, difficulties closing R eye due to weakness. Pt does well with mobility and balance. Limited mobility today due to BP increased after amb/bathroom use. Pt displayed min increase in anxiety when talking about his concerns and that increased his BP. Pt will continue to benefit from acute physical therapy to assist towards established goals. Anticipate pt will benefit from home with family assistance and outpatient physical therapy upon DC from hospital.         Physical Therapy Initial Treatment Plan   Treatment Plan : Gait Training, Stair Training, Therapeutic Activities  Treatment Frequency: 3 Times per Week  Duration: Until Therapy Goals Met    DC Equipment Recommendations:  (eye patch ordered)  Discharge Recommendations: Recommend outpatient physical therapy services to address higher level deficits       Subjective    Pt resting in bed. Agreed to PT.     Objective       11/26/23 1711   Charge Group   Initial Contact Note    Initial Contact Note Order Received and Verified, Physical Therapy Evaluation in Progress with Full Report to Follow.   Precautions   Precautions Fall Risk   Comments strict SBP goal of 100-140   Vitals   Vitals Comments  supine /68 HR 73, sitting /76 HR 78, standing 134/29 , after amb and to the bathroom 150/93 HR 93- RN aware and in the room   Pain   Intervention Heat Applied  (provided pt with warm packs for lower back, pt has cold packs for head)   Pain 0 - 10 Group   Location Head;Back  (chronic back pain)   Location Orientation Right;Anterior   Pain Rating Scale (NPRS) 5   Therapist Pain Assessment Post Activity Pain Same as Prior to Activity   Prior Living Situation   Prior Services Home-Independent   Housing / Facility 1 Story House   Steps Into Home 2   Steps In Home 0   Rail Left Rail  (Steps into Home)   Bathroom Set up Walk In Shower   Equipment Owned None   Lives with - Patient's Self Care Capacity Spouse   Comments Pt was staying at [facility] in [place], plans to return to his home in [place]   Prior Level of Functional Mobility   Bed Mobility Independent   Transfer Status Independent   Ambulation Independent   Ambulation Distance Community   Assistive Devices Used None   Stairs Independent   Cognition    Cognition / Consciousness WDL   Level of Consciousness Alert   Active ROM Lower Body    Active ROM Lower Body  WDL   Strength Lower Body   Lower Body Strength  WDL   Coordination Upper Body   Coordination WDL   Coordination Lower Body    Coordination Lower Body  WDL   Vision   Vision Comments pt complains of double like vision but things overlap eachother, improves when covering one eye. Order pt an eye patch.   Other Treatments   Other Treatments Provided up to bathroom   Balance Assessment   Sitting Balance (Static) Good   Sitting Balance (Dynamic) Good   Standing Balance (Static) Good   Standing Balance (Dynamic) Fair +   Weight Shift Sitting Good   Weight Shift Standing Good   Bed Mobility    Supine to Sit Supervised   Sit to Supine Supervised   Scooting Supervised   Rolling Supervised   Gait Analysis   Gait Level Of Assist Standby Assist   Assistive Device None   Distance (Feet) 6   # of Times  Distance was Traveled 3   Deviation Bradykinetic   Vision Deficits Impacting Mobility does not interfere with mobility   Functional Mobility   Sit to Stand Standby Assist   Bed, Chair, Wheelchair Transfer Standby Assist   Toilet Transfers Standby Assist   Transfer Method Stand Step   Mobility in room, no AD   How much difficulty does the patient currently have...   Turning over in bed (including adjusting bedclothes, sheets and blankets)? 4   Sitting down on and standing up from a chair with arms (e.g., wheelchair, bedside commode, etc.) 4   Moving from lying on back to sitting on the side of the bed? 4   How much help from another person does the patient currently need...   Moving to and from a bed to a chair (including a wheelchair)? 3   Need to walk in a hospital room? 3   Climbing 3-5 steps with a railing? 3   6 clicks Mobility Score 21   Activity Tolerance   Comments limited mobility due to BP increased with activity- RN aware   Short Term Goals    Short Term Goal # 1 amb 100ft supervised in 6 visits for hosuehold distances   Short Term Goal # 2 up/down 4 steps with rail SBA in 6 visits to enter home   Education Group   Education Provided Role of Physical Therapist;Gait Training   Role of Physical Therapist Patient Response Patient;Acceptance;Explanation;Action Demonstration   Gait Training Patient Response Patient;Acceptance;Explanation;Action Demonstration   Physical Therapy Initial Treatment Plan    Treatment Plan  Gait Training;Stair Training;Therapeutic Activities   Treatment Frequency 3 Times per Week   Duration Until Therapy Goals Met   Problem List    Problems Pain;Impaired Ambulation;Impaired Vision;Decreased Activity Tolerance   Anticipated Discharge Equipment and Recommendations   DC Equipment Recommendations   (eye patch ordered)   Discharge Recommendations Recommend outpatient physical therapy services to address higher level deficits   Interdisciplinary Plan of Care Collaboration   IDT  Collaboration with  Nursing   Patient Position at End of Therapy In Bed;Call Light within Reach;Tray Table within Reach;Phone within Reach

## 2023-11-27 NOTE — PROGRESS NOTES
Neurology Progress Note  Neurohospitalist Service, [facility]    Referring Physician: Mark Garcia M.D.    Chief Complaint   Patient presents with    Possible Stroke     PT is tx from [place], PT noted to have full venous occlusion while at outside facility. PT reports HA x 1 week prior. Only neuro deficit at this time is slight left sided facial droop.        HPI: Refer to initial documented Neurology H&P, as detailed in the patient's chart.    Interval History 11/27: No new events overnight    Review of systems: In addition to what is detailed in the HPI and/or updated in the interval history, all other systems reviewed and are negative.    Past Medical History:    has no past medical history on file.    FHx:  family history is not on file.    SHx:   reports that he has never smoked. He has never used smokeless tobacco. He reports that he does not currently use alcohol. He reports that he does not currently use drugs.    Medications:    Current Facility-Administered Medications:     amLODIPine (Norvasc) tablet 10 mg, 10 mg, Oral, Q DAY, Mark Garcia M.D., 10 mg at 11/27/23 0523    sodium chloride (Salt) tablet 1 g, 1 g, Oral, BID, Mark Garcia M.D., 1 g at 11/27/23 0523    senna-docusate (Pericolace Or Senokot S) 8.6-50 MG per tablet 2 Tablet, 2 Tablet, Oral, BID, 2 Tablet at 11/26/23 0607 **AND** polyethylene glycol/lytes (Miralax) PACKET 1 Packet, 1 Packet, Oral, QDAY PRN, 1 Packet at 11/24/23 1709 **AND** magnesium hydroxide (Milk Of Magnesia) suspension 30 mL, 30 mL, Oral, QDAY PRN **AND** bisacodyl (Dulcolax) suppository 10 mg, 10 mg, Rectal, QDAY PRN, Yadiel Kumar M.D.    Respiratory Therapy Consult, , Nebulization, Continuous RT, Yadiel uKmar M.D.    acetaminophen (Tylenol) tablet 650 mg, 650 mg, Oral, Q6HRS PRN, Yadiel Kumar M.D., 650 mg at 11/27/23 0005    atorvastatin (Lipitor) tablet 80 mg, 80 mg, Oral, Q EVENING, Yadiel Kumar,  M.D., 80 mg at 11/26/23 1703    NS (Bolus) 0.9 % infusion 500 mL, 500 mL, Intravenous, Once PRN, Yadiel Kumar M.D.    ondansetron (Zofran) syringe/vial injection 4 mg, 4 mg, Intravenous, Q4HRS PRN, Yadiel Kumar M.D.    morphine 4 MG/ML injection 4 mg, 4 mg, Intravenous, Q4HRS PRN, Yadiel Kumar M.D., 4 mg at 11/25/23 0736    carboxymethylcellulose (Refresh Tears) 0.5 % ophthalmic drops 1 Drop, 1 Drop, Both Eyes, BID PRN, Yadiel Kumar M.D., 1 Drop at 11/26/23 0417    valACYclovir (Valtrex) caplet 1,000 mg, 1,000 mg, Oral, TID, Yadiel Kumar M.D., 1,000 mg at 11/27/23 0524    oxyCODONE immediate-release (Roxicodone) tablet 5 mg, 5 mg, Oral, Q4HRS PRN, Mark Garcia M.D., 5 mg at 11/27/23 0412    heparin infusion 25,000 units in 500 mL 0.45% NACL, 0-30 Units/kg/hr (Order-Specific), Intravenous, Continuous, Mark Garcia M.D., Last Rate: 25.9 mL/hr at 11/27/23 0702, 15 Units/kg/hr at 11/27/23 0702    heparin injection 3,400 Units, 40 Units/kg (Order-Specific), Intravenous, PRN, Mark Garcia M.D.    predniSONE (Deltasone) tablet 60 mg, 60 mg, Oral, DAILY, Mark Garcia M.D., 60 mg at 11/27/23 0523    artificial tears (Eye Lubricant) ophth ointment 1 Application, 1 Application, Right Eye, Nightly, Mark Garcia M.D., 1 Application at 11/26/23 2101    carboxymethylcellulose (Refresh Tears) 0.5 % ophthalmic drops 1 Drop, 1 Drop, Right Eye, BID, Mark Garcia M.D., 1 Drop at 11/27/23 0525    labetalol (Normodyne/Trandate) injection 10 mg, 10 mg, Intravenous, Q10 MIN PRN, Cami Loyd M.D., 10 mg at 11/26/23 0625    Physical Examination:     Vitals:    11/27/23 0500 11/27/23 0523 11/27/23 0600 11/27/23 0700   BP: (!) 152/88 112/61 128/74 125/64   Pulse: 64  65 (!) 52   Resp: 20  (!) 22 17   Temp:       TempSrc:       SpO2: 93%  95% 95%   Weight:       Height:               NEUROLOGICAL EXAM:     Patient is awake and alert [place] x4.  Cranial nerves  with 12 shows right facial weakness upper and lower.  Sustained antigravity in all 4.  Sensation intact.  Speech intact.    Objective Data:    Labs:  Lab Results   Component Value Date/Time    PROTHROMBTM 14.4 11/23/2023 10:41 PM    INR 1.11 11/23/2023 10:41 PM      Lab Results   Component Value Date/Time    WBC 11.8 (H) 11/27/2023 02:28 AM    RBC 5.70 11/27/2023 02:28 AM    HEMOGLOBIN 17.9 11/27/2023 02:28 AM    HEMATOCRIT 50.1 11/27/2023 02:28 AM    MCV 87.9 11/27/2023 02:28 AM    MCH 31.4 11/27/2023 02:28 AM    MCHC 35.7 11/27/2023 02:28 AM    MPV 9.0 11/27/2023 02:28 AM    NEUTSPOLYS 68.70 11/23/2023 10:41 PM    LYMPHOCYTES 19.70 (L) 11/23/2023 10:41 PM    MONOCYTES 9.70 11/23/2023 10:41 PM    EOSINOPHILS 1.10 11/23/2023 10:41 PM    BASOPHILS 0.50 11/23/2023 10:41 PM      Lab Results   Component Value Date/Time    SODIUM 136 11/27/2023 02:28 AM    POTASSIUM 3.9 11/27/2023 02:28 AM    CHLORIDE 101 11/27/2023 02:28 AM    CO2 24 11/27/2023 02:28 AM    GLUCOSE 103 (H) 11/27/2023 02:28 AM    BUN 14 11/27/2023 02:28 AM    CREATININE 0.61 11/27/2023 02:28 AM      Lab Results   Component Value Date/Time    CHOLSTRLTOT 176 11/25/2023 04:18 AM     (H) 11/25/2023 04:18 AM    HDL 48 11/25/2023 04:18 AM    TRIGLYCERIDE 73 11/25/2023 04:18 AM       Lab Results   Component Value Date/Time    ALKPHOSPHAT 84 11/25/2023 04:18 AM    ASTSGOT 13 11/25/2023 04:18 AM    ALTSGPT 25 11/25/2023 04:18 AM    TBILIRUBIN 0.8 11/25/2023 04:18 AM        Imaging/Testing:  CT-HEAD W/O   Final Result         1. No evidence of acute cerebral infarction, hemorrhage or mass lesion.      2. Increased attenuation in the superior sagittal sinus and transverse sinuses consistent with dural venous sinus thrombosis.         MR-BRAIN-WITH & W/O   Final Result      1.  Patchy area of T2 and FLAIR hyperintensity in the left posterior frontal deep white matter with prominent venous flow voids in this vicinity. Findings most consistent with  developmental venous anomaly with associated gliosis.   2.  Dural venous sinus thrombosis superior sagittal sinus, bilateral transverse sinuses, left sigmoid sinus.      MR-VENOGRAM (MRV) HEAD   Final Result   Addendum (preliminary) 1 of 1   ADDENDUM/correction on the technique portion of the report: (Exam was not    and MRA [place] but rather a cerebral MR venogram). See    correction below.      TECHNIQUE/EXAM DESCRIPTION:   MRV - Cerebral Magnetic Resonance Venogram.      The study was performed on a Corrine 3.0 Cookie MRI scanner.   Cerebral Magnetic Resonance Venography (MRV) was performed with 2D    time-of-flight (TOF) technique with acquisitions obtained in the axial,    coronal, and sagittal planes. Images are reviewed at the PACS or    [employer] workstations as source images in all 3    planes as well as maximum intensity ray projection (MIP) multiplanar 3D    reconstructions.      Final      1.  Study is positive for dural venous sinus thrombosis. Superior sagittal sinus thrombosis, bilateral transverse sinus thrombosis, left sigmoid sinus thrombosis.   2.  Findings are concordant with CT-CTA of the head outside films.            Assessment and Plan:    58-year-old male transferred in outside facility.  Found to have extensive venous thrombosis in the superior sinus, left transverse and sigmoid sinus.  On heparin therapy for past 3 days.  Okay to transition to oral anticoagulation Eliquis 5 mg twice daily today.  Etiology of the thrombus is unknown.  Hypercoagulable work-up still pending.      Plan:  1.  Switch to oral anticoagulan Eliquis, continue for 6 months. 5 mg twice daily  2.  Hypercoagulable panel including protein C, S, Antithrombin, factor V, factor VIII, lupus will need to be follow-up in outpatient.  3.  Okay to change neurochecks to every 4 hours.  4.  Heme-onc consultation outpatient for hypercoagulable work-up.  5.  Maintain normal blood pressures.    Spent over 52 minutes  reviewing notes since admission, CT scan, MRI, physician notes and examined the patient.    If no issues with the oral anticoagulation for the next day most likely can be discharged.        The evaluation of the patient, and recommended management, was discussed with the resident staff. I have performed a physical exam and reviewed and updated ROS and Plan today (11/27/2023). In review of yesterday's note (11/26/2023), there are no changes except as documented above.    This chart was partially generated using voice recognition technology and sound alike word replacement may be present, best efforts were made to make the chart accurate.    Bob Hester MD  Board Certified Neurology, ABPN  (t) [phone]

## 2023-11-27 NOTE — CARE PLAN
The patient is Stable - Low risk of patient condition declining or worsening    Shift Goals  Clinical Goals: Monitor VS, q4h neuro, pain control  Patient Goals: pain control  Family Goals: RAÚL    Progress made toward(s) clinical / shift goals:  VS stable, neuro stable, pain controlled     Problem: Pain - Standard  Goal: Alleviation of pain or a reduction in pain to the patient’s comfort goal  Description: Target End Date:  Prior to discharge or change in level of care    Document on Vitals flowsheet    1.  Document pain using the appropriate pain scale per order or unit policy  2.  Educate and implement non-pharmacologic comfort measures (i.e. relaxation, distraction, massage, cold/heat therapy, etc.)  3.  Pain management medications as ordered  4.  Reassess pain after pain med administration per policy  5.  If opiods administered assess patient's response to pain medication is appropriate per POSS sedation scale  6.  Follow pain management plan developed in collaboration with patient and interdisciplinary team (including palliative care or pain specialists if applicable)  Outcome: Progressing  Note: States tolerable at this time, does not want prns        Problem: Neuro Status  Goal: Neuro status will remain stable or improve  Description: Target End Date:  Prior to discharge or change in level of care    Document on Neuro assessment in the Assessment flowsheet    1.  Assess and monitor neurologic status per provider order/protocol/unit policy  2.  Assess level of consciousness and orientation  3.  Assess for speech, dysarthria, dysphagia, facial symmetry  4.  Assess visual field, eye movements, gaze preference, pupil reaction and size  5.  Assess muscle strength and motor response in all four extremities  6.  Assess for sensation (numbness and tingling)  7.  Assess basic neuro reflexes (cough, gag, corneal)  8.  Identify changes in neuro status and report to provider for testing/treatment orders  Outcome:  Progressing  Flowsheets (Taken 11/27/2023 9717)  Level of Consciousness: Alert     Problem: Self Care  Goal: Patient will have the ability to perform ADLs independently or with assistance (bathe, groom, dress, toilet and feed)  Description: Target End Date:  Prior to discharge or change in level of care    Document on ADL flowsheet    1.  Assess the capability and level of deficiency to perform ADLs  2.  Encourage family/care giver involvement  3.  Provide assistive devices  4.  Consider PT/OT evaluations  5.  Maintain support, give positive feedback, encourage self-care allowing extra time and verbal cuing as needed  6.  Avoid doing something for patients they can do themselves, but provide assistance as needed  7.  Assist in anticipating/planning individual needs  8.  Collaborate with Case Management and  to meet discharge needs  Outcome: Progressing     Problem: Fall Risk  Goal: Patient will remain free from falls  Description: Target End Date:  Prior to discharge or change in level of care    Document interventions on the Hanh Barth Fall Risk Assessment    1.  Assess for fall risk factors  2.  Implement fall precautions  Outcome: Progressing  Note: Fall precautions in place          Patient is not progressing towards the following goals:

## 2023-11-27 NOTE — THERAPY
Occupational Therapy   Initial Evaluation     Patient Name: Khalif Wilson  Age:  58 y.o., Sex:  male  Medical Record #: [id number]  Today's Date: 11/27/2023     Precautions: (P) Fall Risk  Comments: (P) strict -140    Assessment  Patient is 58 y.o. male transferred from outside facility with HA, nausea, vomiting, and R facial droop found to have extensive venous thrombosis in superior and left transverse and sigmoid sinus, etiology unknown. Pt seen for to evaluation, and primarily limited by BP with activity (see below), and visual deficits impacting function. Pt reports double/fuzzy vision which improves with closing one eye. Provided education on activity, ADL, and IADL pacing during stay and upon DC. Anticipate DC home with family support. Will follow to maximize independence and address said functional deficits.    Plan    Occupational Therapy Initial Treatment Plan   Treatment Interventions: (P) Self Care / Activities of Daily Living, Adaptive Equipment, Neuro Re-Education / Balance, Therapeutic Activity  Treatment Frequency: (P) 3 Times per Week  Duration: (P) Until Therapy Goals Met    DC Equipment Recommendations: (P) None  Discharge Recommendations: (P) Anticipate that the patient will have no further occupational therapy needs after discharge from the hospital      11/27/23 1035   Prior Living Situation   Prior Services Home-Independent   Housing / Facility 1 Story House   Bathroom Set up Walk In Shower   Equipment Owned None   Lives with - Patient's Self Care Capacity Spouse   Comments Lives in [place] with spouse   Prior Level of ADL Function   Self Feeding Independent   Grooming / Hygiene Independent   Bathing Independent   Dressing Independent   Toileting Independent   Prior Level of IADL Function   Medication Management Independent   Laundry Independent   Kitchen Mobility Independent   Finances Independent   Home Management Independent   Shopping Independent   Prior Level Of Mobility Independent  "Without Device in Community   Driving / Transportation Driving Independent   Occupation (Pre-Hospital Vocational) Retired Due To Age   Precautions   Precautions Fall Risk   Comments strict -140   Vitals   Vitals Comments after mobilizing to BR with CNA, /90 in standing, seated 5 min 169/85, supine 146/75, supine 5 min 140/74   Cognition    Cognition / Consciousness WDL   Comments pleasant, very chatty   Active ROM Upper Body   Active ROM Upper Body  WDL   Strength Upper Body   Upper Body Strength  WDL   Sensation Upper Body   Upper Extremity Sensation  WDL   Balance Assessment   Sitting Balance (Static) Good   Sitting Balance (Dynamic) Good   Standing Balance (Static) Good   Standing Balance (Dynamic) Good   Weight Shift Sitting Good   Weight Shift Standing Good   Comments chose to use FWW   Bed Mobility    Sit to Supine Supervised   Scooting Supervised   Rolling Supervised   ADL Assessment   Grooming Supervision;Standing  (grooming and brushing teeth)   Upper Body Dressing Supervision   Lower Body Dressing   (had pants on reported no concerns)   Toileting Supervision   How much help from another person does the patient currently need...   6 Clicks Daily Activity Score 24   Functional Mobility   Sit to Stand Standby Assist   Bed, Chair, Wheelchair Transfer Standby Assist   Toilet Transfers Standby Assist   Visual Perception   Visual Perception  X   Comments covering R eye majority of session, reported \"fussy\" vision using both eyes, and improved acuity using one eye (either R or L)   Activity Tolerance   Comments poor activity tolerance 2/2 BP   Patient / Family Goals   Patient / Family Goal #1 to improve vision   Short Term Goals   Short Term Goal # 1 Pt will tolerate 10 min of ADL activity SBA   Short Term Goal # 2 Pt will demo FB dressing SPV   Short Term Goal # 3 Pt will verbalize 3 compensatory techniques for ADLs with poor vision   Education Group   Role of Occupational Therapist Patient Response " Patient;Acceptance;Explanation   Additional Comments ed on ADLs/activity pacing   Occupational Therapy Initial Treatment Plan    Treatment Interventions Self Care / Activities of Daily Living;Adaptive Equipment;Neuro Re-Education / Balance;Therapeutic Activity   Treatment Frequency 3 Times per Week   Duration Until Therapy Goals Met   Problem List   Problem List Decreased Active Daily Living Skills;Decreased Activity Tolerance;Impaired Vision   Anticipated Discharge Equipment and Recommendations   DC Equipment Recommendations None   Discharge Recommendations Anticipate that the patient will have no further occupational therapy needs after discharge from the hospital

## 2023-11-27 NOTE — CARE PLAN
Problem: Pain - Standard  Goal: Alleviation of pain or a reduction in pain to the patient’s comfort goal  Outcome: Progressing     Problem: Optimal Care of the Stroke Patient  Goal: Optimal emergency care for the stroke patient  Outcome: Progressing     Problem: Knowledge Deficit - Stroke Education  Goal: Patient's knowledge of stroke and risk factors will improve  Outcome: Progressing     Problem: Risk for Aspiration  Goal: Patient's risk for aspiration will be absent or decrease  Outcome: Progressing   The patient is Watcher - Medium risk of patient condition declining or worsening    Shift Goals  Clinical Goals: Pain control, BP control  Patient Goals: Pain control, rest  Family Goals: Updates    Progress made toward(s) clinical / shift goals:  Patient medicated for pain this shift.

## 2023-11-27 NOTE — CARE PLAN
The patient is Stable - Low risk of patient condition declining or worsening    Shift Goals  Clinical Goals: Moniotr VS, labs, pain, ji checks  Patient Goals: Less pain and rest  Family Goals: RAÚL    Progress made toward(s) clinical / shift goals:    Problem: Pain - Standard  Goal: Alleviation of pain or a reduction in pain to the patient’s comfort goal  Outcome: Progressing     Problem: Optimal Care of the Stroke Patient  Goal: Optimal emergency care for the stroke patient  Outcome: Progressing  Goal: Optimal acute care for the stroke patient  Outcome: Progressing     Problem: Knowledge Deficit - Stroke Education  Goal: Patient's knowledge of stroke and risk factors will improve  Outcome: Progressing     Problem: Psychosocial - Patient Condition  Goal: Patient's ability to verbalize feelings about condition will improve  Outcome: Progressing  Goal: Patient's ability to re-evaluate and adapt role responsibilities will improve  Outcome: Progressing     Problem: Discharge Planning - Stroke  Goal: Ensure Stroke Core Measures are met prior to discharge  Outcome: Progressing  Goal: Patient’s continuum of care needs will be met  Outcome: Progressing     Problem: Neuro Status  Goal: Neuro status will remain stable or improve  Outcome: Progressing     Problem: Hemodynamic Monitoring  Goal: Patient's hemodynamics, fluid balance and neurologic status will be stable or improve  Outcome: Progressing     Problem: Respiratory - Stroke Patient  Goal: Patient will achieve/maintain optimum respiratory rate/effort  Outcome: Progressing     Problem: Dysphagia  Goal: Dysphagia will improve  Outcome: Progressing     Problem: Risk for Aspiration  Goal: Patient's risk for aspiration will be absent or decrease  Outcome: Progressing     Problem: Urinary Elimination  Goal: Establish and maintain regular urinary output  Outcome: Progressing     Problem: Bowel Elimination  Goal: Establish and maintain regular bowel function  Outcome:  Progressing     Problem: Mobility - Stroke  Goal: Patient's capacity to carry out activities will improve  Outcome: Progressing  Goal: Spasticity will be prevented or improved  Outcome: Progressing  Goal: Subluxation will be prevented or improved  Outcome: Progressing     Problem: Self Care  Goal: Patient will have the ability to perform ADLs independently or with assistance (bathe, groom, dress, toilet and feed)  Outcome: Progressing     Problem: Knowledge Deficit - Standard  Goal: Patient and family/care givers will demonstrate understanding of plan of care, disease process/condition, diagnostic tests and medications  Outcome: Progressing     Problem: Fall Risk  Goal: Patient will remain free from falls  Outcome: Progressing

## 2023-11-27 NOTE — PROGRESS NOTES
Report given to MARLON Giordano. Pt transferring from [id number] to [id number] with transport.  All pt belonging and medication sent with pt and wife.

## 2023-11-27 NOTE — PROGRESS NOTES
Hospital Medicine Daily Progress Note    Date of Service  11/27/2023    Chief Complaint  Khalif Wilson is a 58 y.o. male admitted 11/23/2023 with headache    Hospital Course  58-year-old male with history of dyslipidemia presented on 11/23/2023 with 1 week history of headache for which she was evaluated at outside hospital diagnosed with a viral syndrome and discharged.  The patient returned with worsening headache associated with nausea vomiting and right-sided facial droop.  CTA demonstrated extensive dural venous sinus thrombosis and the patient was transferred to our facility for higher level of care.    Interval Problem Update    Headache is improved this morning  He is afebrile on room air  Cleared by neurology to transition to Eliquis I discontinued heparin drip and started Eliquis  I reviewed BMP potassium 3.9 BUN 14 creatinine 0.61  I reviewed CBC WBC 11.8 hemoglobin 17.9 platelets 309        I have discussed this patient's plan of care and discharge plan at IDT rounds today with Case Management, Nursing, Nursing leadership, and other members of the IDT team.    Consultants/Specialty  neurology    Code Status  Full Code    Disposition  Medically Cleared  I have placed the appropriate orders for post-discharge needs.    Review of Systems  Review of Systems   Constitutional:  Positive for malaise/fatigue. Negative for fever.   Eyes:  Positive for double vision (Improved).   Neurological:  Positive for focal weakness.        Physical Exam  Temp:  [36.1 °C (96.9 °F)-36.4 °C (97.6 °F)] 36.4 °C (97.6 °F)  Pulse:  [50-89] 60  Resp:  [16-34] 20  BP: ()/() 135/78  SpO2:  [91 %-96 %] 94 %    Physical Exam  Vitals and nursing note reviewed.   Constitutional:       Appearance: He is well-developed. He is not diaphoretic.   HENT:      Head: Normocephalic and atraumatic.      Mouth/Throat:      Pharynx: No oropharyngeal exudate.   Eyes:      General: No scleral icterus.        Right eye: No discharge.          Left eye: No discharge.      Conjunctiva/sclera: Conjunctivae normal.      Pupils: Pupils are equal, round, and reactive to light.   Neck:      Vascular: No JVD.      Trachea: No tracheal deviation.   Cardiovascular:      Rate and Rhythm: Normal rate and regular rhythm.      Heart sounds: No murmur heard.     No friction rub. No gallop.   Pulmonary:      Effort: Pulmonary effort is normal. No respiratory distress.      Breath sounds: Normal breath sounds. No stridor. No wheezing.   Chest:      Chest wall: No tenderness.   Abdominal:      General: Bowel sounds are normal. There is no distension.      Palpations: Abdomen is soft.      Tenderness: There is no abdominal tenderness. There is no rebound.   Musculoskeletal:         General: No tenderness.      Cervical back: Neck supple.   Skin:     General: Skin is warm and dry.      Nails: There is no clubbing.   Neurological:      Mental Status: He is alert and oriented to person, place, and time.      Cranial Nerves: Cranial nerve deficit present.      Motor: No abnormal muscle tone.      Comments: Right facial weakness   Psychiatric:         Behavior: Behavior normal.         Fluids    Intake/Output Summary (Last 24 hours) at 11/27/2023 1004  Last data filed at 11/27/2023 0500  Gross per 24 hour   Intake 236 ml   Output 1910 ml   Net -1674 ml         Laboratory  Recent Labs     11/25/23 0418 11/26/23 0415 11/27/23 0228   WBC 11.0* 15.8* 11.8*   RBC 5.65 5.66 5.70   HEMOGLOBIN 17.6 17.8 17.9   HEMATOCRIT 49.2 50.7 50.1   MCV 87.1 89.6 87.9   MCH 31.2 31.4 31.4   MCHC 35.8 35.1 35.7   RDW 37.0 37.7 37.7   PLATELETCT 323 328 309   MPV 9.1 9.1 9.0       Recent Labs     11/25/23 0418 11/26/23 0415 11/27/23 0228   SODIUM 131* 135 136   POTASSIUM 4.1 3.9 3.9   CHLORIDE 98 99 101   CO2 22 23 24   GLUCOSE 143* 122* 103*   BUN 11 16 14   CREATININE 0.60 0.71 0.61   CALCIUM 9.4 9.1 9.0                 Recent Labs     11/25/23 0418   TRIGLYCERIDE 73   HDL 48   *          Imaging  CT-HEAD W/O   Final Result         1. No evidence of acute cerebral infarction, hemorrhage or mass lesion.      2. Increased attenuation in the superior sagittal sinus and transverse sinuses consistent with dural venous sinus thrombosis.         MR-BRAIN-WITH & W/O   Final Result      1.  Patchy area of T2 and FLAIR hyperintensity in the left posterior frontal deep white matter with prominent venous flow voids in this vicinity. 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   2.  Dural venous sinus thrombosis superior sagittal sinus, bilateral transverse sinuses, left sigmoid sinus.      MR-VENOGRAM (MRV) HEAD   Final Result   Addendum (preliminary) 1 of 1   ADDENDUM/correction on the technique portion of the report: (Exam was not    and MRA [place] [facility] [place] but rather a cerebral MR venogram). See    correction below.      TECHNIQUE/EXAM DESCRIPTION:   MRV - Cerebral Magnetic Resonance Venogram.      The study was performed on a Corrine 3.0 Cookie MRI scanner.   Cerebral Magnetic Resonance Venography (MRV) was performed with 2D    time-of-flight (TOF) technique with acquisitions obtained in the axial,    coronal, and sagittal planes. Images are reviewed at the PACS or    [employer] workstations as source images in all 3    planes as well as maximum intensity ray projection (MIP) multiplanar 3D    reconstructions.      Final      1.  Study is positive for dural venous sinus thrombosis. Superior sagittal sinus thrombosis, bilateral transverse sinus thrombosis, left sigmoid sinus thrombosis.   2.  Findings are concordant with CT-CTA of the head outside films.           Assessment/Plan  * Cerebral venous thrombosis- (present on admission)  Assessment & Plan  Patient with 1 week history of headaches, worsening with nausea and vomiting, CTA at outside hospital demonstrating cerebral venous thrombosis,     Neurology following  Transition heparin drip to Eliquis 5 twice  daily for 6-month  Neurochecks every 4 hours  Continue close clinical monitoring  Reviewed with patient need for outpatient   ollow-up with neurology and hematology and defer hypercoagulable workup to outpatient setting since he was on heparin drip    Patient reports being up-to-date on routine cancer screening.       HTN (hypertension)  Assessment & Plan  Goal -1 40  Continue amlodipine and monitor blood pressure    Hyponatremia  Assessment & Plan  Resolved sodium 136  DC salt tablets  Monitor electrolytes I ordered repeat BMP    Bell's palsy- (present on admission)  Assessment & Plan  MRI negative for acute stroke  Continue prednisone and Valtrex  Artificial tears      Hypokalemia- (present on admission)  Assessment & Plan  Repleted    Nausea and vomiting- (present on admission)  Assessment & Plan  Resolved           VTE prophylaxis:    therapeutic anticoagulation with eliquis 5 mg BID      I have performed a physical exam and reviewed and updated ROS and Plan today (11/27/2023). In review of yesterday's note (11/26/2023), there are no changes except as documented above.

## 2023-11-27 NOTE — PROGRESS NOTES
4 Eyes Skin Assessment Completed by MARLON hubbard and MARLON peraza.    Head WDL  Ears WDL  Nose WDL  Mouth WDL  Neck WDL  Breast/Chest WDL  Shoulder Blades WDL  Spine WDL  (R) Arm/Elbow/Hand WDL  (L) Arm/Elbow/Hand WDL  Abdomen WDL  Groin WDL  Scrotum/Coccyx/Buttocks WDL  (R) Leg WDL  (L) Leg WDL  (R) Heel/Foot/Toe Scab  (L) Heel/Foot/Toe WDL          Devices In Places Pulse Ox      Interventions In Place Pillows    Possible Skin Injury No    Pictures Uploaded Into Epic N/A  Wound Consult Placed N/A  RN Wound Prevention Protocol Ordered No

## 2023-11-28 ENCOUNTER — PHARMACY VISIT (OUTPATIENT)
Dept: PHARMACY | Facility: MEDICAL CENTER | Age: 58
End: 2023-11-28
Payer: COMMERCIAL

## 2023-11-28 VITALS
TEMPERATURE: 97.5 F | DIASTOLIC BLOOD PRESSURE: 65 MMHG | OXYGEN SATURATION: 95 % | HEIGHT: 68 IN | RESPIRATION RATE: 17 BRPM | WEIGHT: 184.53 LBS | SYSTOLIC BLOOD PRESSURE: 120 MMHG | HEART RATE: 75 BPM | BODY MASS INDEX: 27.97 KG/M2

## 2023-11-28 PROBLEM — R52 PAIN: Status: ACTIVE | Noted: 2023-11-28

## 2023-11-28 PROBLEM — R51.9 HEADACHE: Status: ACTIVE | Noted: 2023-11-28

## 2023-11-28 PROBLEM — I10 HTN (HYPERTENSION): Status: RESOLVED | Noted: 2023-11-25 | Resolved: 2023-11-28

## 2023-11-28 PROBLEM — R11.2 NAUSEA AND VOMITING: Status: RESOLVED | Noted: 2023-11-24 | Resolved: 2023-11-28

## 2023-11-28 PROBLEM — E87.6 HYPOKALEMIA: Status: RESOLVED | Noted: 2023-11-24 | Resolved: 2023-11-28

## 2023-11-28 LAB
ANION GAP SERPL CALC-SCNC: 9 MMOL/L (ref 7–16)
BUN SERPL-MCNC: 15 MG/DL (ref 8–22)
CALCIUM SERPL-MCNC: 8.9 MG/DL (ref 8.5–10.5)
CHLORIDE SERPL-SCNC: 100 MMOL/L (ref 96–112)
CO2 SERPL-SCNC: 28 MMOL/L (ref 20–33)
CREAT SERPL-MCNC: 0.88 MG/DL (ref 0.5–1.4)
ERYTHROCYTE [DISTWIDTH] IN BLOOD BY AUTOMATED COUNT: 38.6 FL (ref 35.9–50)
GFR SERPLBLD CREATININE-BSD FMLA CKD-EPI: 100 ML/MIN/1.73 M 2
GLUCOSE SERPL-MCNC: 102 MG/DL (ref 65–99)
HCT VFR BLD AUTO: 49.9 % (ref 42–52)
HGB BLD-MCNC: 17.5 G/DL (ref 14–18)
MCH RBC QN AUTO: 31.7 PG (ref 27–33)
MCHC RBC AUTO-ENTMCNC: 35.1 G/DL (ref 32.3–36.5)
MCV RBC AUTO: 90.4 FL (ref 81.4–97.8)
PLATELET # BLD AUTO: 313 K/UL (ref 164–446)
PMV BLD AUTO: 9.1 FL (ref 9–12.9)
POTASSIUM SERPL-SCNC: 4.2 MMOL/L (ref 3.6–5.5)
RBC # BLD AUTO: 5.52 M/UL (ref 4.7–6.1)
SODIUM SERPL-SCNC: 137 MMOL/L (ref 135–145)
WBC # BLD AUTO: 15.3 K/UL (ref 4.8–10.8)

## 2023-11-28 PROCEDURE — A9270 NON-COVERED ITEM OR SERVICE: HCPCS | Performed by: HOSPITALIST

## 2023-11-28 PROCEDURE — 700102 HCHG RX REV CODE 250 W/ 637 OVERRIDE(OP): Performed by: STUDENT IN AN ORGANIZED HEALTH CARE EDUCATION/TRAINING PROGRAM

## 2023-11-28 PROCEDURE — 99239 HOSP IP/OBS DSCHRG MGMT >30: CPT | Performed by: STUDENT IN AN ORGANIZED HEALTH CARE EDUCATION/TRAINING PROGRAM

## 2023-11-28 PROCEDURE — 85027 COMPLETE CBC AUTOMATED: CPT

## 2023-11-28 PROCEDURE — 80048 BASIC METABOLIC PNL TOTAL CA: CPT

## 2023-11-28 PROCEDURE — 700102 HCHG RX REV CODE 250 W/ 637 OVERRIDE(OP): Performed by: HOSPITALIST

## 2023-11-28 PROCEDURE — 700111 HCHG RX REV CODE 636 W/ 250 OVERRIDE (IP): Performed by: HOSPITALIST

## 2023-11-28 PROCEDURE — RXMED WILLOW AMBULATORY MEDICATION CHARGE: Performed by: STUDENT IN AN ORGANIZED HEALTH CARE EDUCATION/TRAINING PROGRAM

## 2023-11-28 PROCEDURE — A9270 NON-COVERED ITEM OR SERVICE: HCPCS | Performed by: STUDENT IN AN ORGANIZED HEALTH CARE EDUCATION/TRAINING PROGRAM

## 2023-11-28 PROCEDURE — RXOTC WILLOW AMBULATORY OTC CHARGE: Performed by: PHARMACIST

## 2023-11-28 RX ORDER — PREDNISONE 20 MG/1
60 TABLET ORAL DAILY
Qty: 6 TABLET | Refills: 0 | Status: SHIPPED | OUTPATIENT
Start: 2023-11-28 | End: 2023-11-28 | Stop reason: SDUPTHER

## 2023-11-28 RX ORDER — VALACYCLOVIR HYDROCHLORIDE 1 G/1
1000 TABLET, FILM COATED ORAL 3 TIMES DAILY
Qty: 6 TABLET | Refills: 0 | Status: ACTIVE | OUTPATIENT
Start: 2023-11-28 | End: 2023-11-28 | Stop reason: SDUPTHER

## 2023-11-28 RX ORDER — PREDNISONE 20 MG/1
60 TABLET ORAL DAILY
Qty: 6 TABLET | Refills: 0 | Status: SHIPPED | OUTPATIENT
Start: 2023-11-28 | End: 2023-11-30

## 2023-11-28 RX ORDER — OXYCODONE HYDROCHLORIDE 5 MG/1
5 TABLET ORAL EVERY 6 HOURS PRN
Qty: 20 TABLET | Refills: 0 | Status: SHIPPED | OUTPATIENT
Start: 2023-11-28 | End: 2023-12-03

## 2023-11-28 RX ORDER — AMLODIPINE BESYLATE 10 MG/1
10 TABLET ORAL DAILY
Qty: 30 TABLET | Refills: 1 | Status: SHIPPED | OUTPATIENT
Start: 2023-11-29

## 2023-11-28 RX ORDER — DULOXETIN HYDROCHLORIDE 30 MG/1
CAPSULE, DELAYED RELEASE ORAL
Qty: 30 CAPSULE | Refills: 1 | Status: SHIPPED | OUTPATIENT
Start: 2023-11-28 | End: 2023-11-28 | Stop reason: SDUPTHER

## 2023-11-28 RX ORDER — AMLODIPINE BESYLATE 10 MG/1
10 TABLET ORAL DAILY
Qty: 30 TABLET | Refills: 1 | Status: SHIPPED | OUTPATIENT
Start: 2023-11-29 | End: 2023-11-28 | Stop reason: SDUPTHER

## 2023-11-28 RX ORDER — VALACYCLOVIR HYDROCHLORIDE 1 G/1
1000 TABLET, FILM COATED ORAL 3 TIMES DAILY
Qty: 6 TABLET | Refills: 0 | Status: ACTIVE | OUTPATIENT
Start: 2023-11-28 | End: 2023-11-30

## 2023-11-28 RX ORDER — CARBOXYMETHYLCELLULOSE SODIUM 5 MG/ML
1 SOLUTION/ DROPS OPHTHALMIC 2 TIMES DAILY PRN
Qty: 30 ML | Refills: 1 | Status: SHIPPED | OUTPATIENT
Start: 2023-11-28 | End: 2023-11-28 | Stop reason: SDUPTHER

## 2023-11-28 RX ORDER — LOSARTAN POTASSIUM 25 MG/1
25 TABLET ORAL DAILY
Qty: 30 TABLET | Refills: 2 | Status: SHIPPED | OUTPATIENT
Start: 2023-11-29

## 2023-11-28 RX ORDER — ATORVASTATIN CALCIUM 20 MG/1
20 TABLET, FILM COATED ORAL NIGHTLY
Qty: 30 TABLET | Refills: 2 | Status: SHIPPED | OUTPATIENT
Start: 2023-11-28

## 2023-11-28 RX ORDER — CARBOXYMETHYLCELLULOSE SODIUM 5 MG/ML
1 SOLUTION/ DROPS OPHTHALMIC 2 TIMES DAILY PRN
Qty: 30 ML | Refills: 1 | Status: SHIPPED | OUTPATIENT
Start: 2023-11-28

## 2023-11-28 RX ORDER — OXYCODONE HYDROCHLORIDE 5 MG/1
5 TABLET ORAL EVERY 6 HOURS PRN
Qty: 20 TABLET | Refills: 0 | Status: SHIPPED | OUTPATIENT
Start: 2023-11-28 | End: 2023-11-28 | Stop reason: SDUPTHER

## 2023-11-28 RX ORDER — ATORVASTATIN CALCIUM 20 MG/1
20 TABLET, FILM COATED ORAL NIGHTLY
Qty: 30 TABLET | Refills: 2 | Status: SHIPPED | OUTPATIENT
Start: 2023-11-28 | End: 2023-11-28 | Stop reason: SDUPTHER

## 2023-11-28 RX ORDER — LOSARTAN POTASSIUM 25 MG/1
25 TABLET ORAL DAILY
Qty: 30 TABLET | Refills: 2 | Status: SHIPPED | OUTPATIENT
Start: 2023-11-29 | End: 2023-11-28 | Stop reason: SDUPTHER

## 2023-11-28 RX ORDER — CARBOXYMETHYLCELLULOSE SODIUM 5 MG/ML
1 SOLUTION/ DROPS OPHTHALMIC
Qty: 30 ML | Refills: 1 | Status: SHIPPED | OUTPATIENT
Start: 2023-11-28 | End: 2023-11-28

## 2023-11-28 RX ORDER — DULOXETIN HYDROCHLORIDE 30 MG/1
CAPSULE, DELAYED RELEASE ORAL
Qty: 30 CAPSULE | Refills: 1 | Status: SHIPPED | OUTPATIENT
Start: 2023-11-28 | End: 2023-12-28

## 2023-11-28 RX ADMIN — APIXABAN 5 MG: 5 TABLET, FILM COATED ORAL at 04:18

## 2023-11-28 RX ADMIN — OXYCODONE 5 MG: 5 TABLET ORAL at 00:44

## 2023-11-28 RX ADMIN — CARBOXYMETHYLCELLULOSE SODIUM 1 DROP: 5 SOLUTION/ DROPS OPHTHALMIC at 09:56

## 2023-11-28 RX ADMIN — SENNOSIDES AND DOCUSATE SODIUM 2 TABLET: 50; 8.6 TABLET ORAL at 04:19

## 2023-11-28 RX ADMIN — ACETAMINOPHEN 650 MG: 325 TABLET, FILM COATED ORAL at 07:53

## 2023-11-28 RX ADMIN — VALACYCLOVIR HYDROCHLORIDE 1000 MG: 500 TABLET, FILM COATED ORAL at 04:17

## 2023-11-28 RX ADMIN — OXYCODONE 5 MG: 5 TABLET ORAL at 12:07

## 2023-11-28 RX ADMIN — ACETAMINOPHEN 650 MG: 325 TABLET, FILM COATED ORAL at 00:30

## 2023-11-28 RX ADMIN — AMLODIPINE BESYLATE 10 MG: 10 TABLET ORAL at 04:15

## 2023-11-28 RX ADMIN — LOSARTAN POTASSIUM 25 MG: 50 TABLET, FILM COATED ORAL at 04:16

## 2023-11-28 RX ADMIN — PREDNISONE 60 MG: 50 TABLET ORAL at 04:17

## 2023-11-28 RX ADMIN — CARBOXYMETHYLCELLULOSE SODIUM 1 DROP: 5 SOLUTION/ DROPS OPHTHALMIC at 04:20

## 2023-11-28 ASSESSMENT — PAIN DESCRIPTION - PAIN TYPE
TYPE: ACUTE PAIN
TYPE: ACUTE PAIN

## 2023-11-28 NOTE — CARE PLAN
The patient is Stable - Low risk of patient condition declining or worsening    Shift Goals  Clinical Goals: neuro monitoring and pain management  Patient Goals: pain management  Family Goals: RAÚL    Progress made toward(s) clinical / shift goals:    Problem: Knowledge Deficit - Stroke Education  Goal: Patient's knowledge of stroke and risk factors will improve  Outcome: Progressing  Note: Patient educated on relevant risk factors for CVA such as HTN and DLD and how to mitigate those risk factors.      Problem: Pain - Standard  Goal: Alleviation of pain or a reduction in pain to the patient’s comfort goal  Outcome: Progressing  Note: Patient medicated per MAR for eye and head pain.      Problem: Neuro Status  Goal: Neuro status will remain stable or improve  Outcome: Progressing  Note: Q 4 neuro checks and NIH stroke scale in use. Patient A&O x 4, full sensation intact. NIH score of 2.      Problem: Fall Risk  Goal: Patient will remain free from falls  Outcome: Progressing  Note: Fall precautions in place. Bed locked and in the lowest position. Call light in reach.        Patient is not progressing towards the following goals: N/A

## 2023-11-28 NOTE — PROGRESS NOTES
1230:Reviewed discharge instructions with patient and family.  Awaiting updated prescription orders from Dr. Cardenas.    1324: Medications updated to pharmacy.  Discharge instructions reviewed and signed, all questions answered, PIV removed, Meds to Beds delivered.

## 2023-11-28 NOTE — DISCHARGE SUMMARY
Discharge Summary    CHIEF COMPLAINT ON ADMISSION  Chief Complaint   Patient presents with    Possible Stroke     PT is tx from [facility], PT noted to have full venous occlusion while at outside facility. PT reports HA x 1 week prior. Only neuro deficit at this time is slight left sided facial droop.        Reason for Admission  Extensive cerebral sinus thrombosis    Admission Date  11/23/2023    CODE STATUS  Full Code    HPI & HOSPITAL COURSE  Khalif Wilson is a 58-year-old male with history of dyslipidemia presented on 11/23/2023 with 1 week history of headache for which he was evaluated at [facility] and diagnosed with a viral syndrome and was subsequently discharged from the emergency room.  [facility].  He returned to the emergency room with worsening headache associated with nausea vomiting and right-sided facial droop.  CT angiogram of the head demonstrated extensive dural venous sinus thrombosis and the patient was transferred to our facility for higher level of care.  Patient was admitted to the intermediate care unit for neurochecks every 2 hours.  Neurology was consulted and recommended a MRI of the brain with and without contrast as well as a MRI venogram.  MRI of the brain showed patchy areas of T2 and FLAIR intensity in the left posterior frontal deep white matter with prominent venous flow voids in the vicinity most consistent with developmental venous anomaly with associated gliosis.  Noted were dural venous sinus thrombosis in the superior sagittal sinus, bilateral transverse sinuses, and left sigmoid sinus.  Lateral findings also confirmed on the MR venogram.    Patient was started on a heparin drip, and he was also started on valacyclovir and prednisone for Bell's palsy.  He was given an eye patch as well as eyedrops to avoid excessive drying of the eye and the potential for injury and scarring.  He continued to  remain stable and was transferred to the neurology floor.  With improvement of patient's headache, his heparin drip was transitioned to apixaban.  Patient was started on blood pressure medications due to uncontrolled hypertension for blood pressure goal of less than 140 systolic.  Thrombophilia workup was initiated for recommendations to continue full anticoagulation with apixaban for 6 months unless thrombophilia workup was abnormal.  The final results of the thrombophilia testing was pending at the time of discharge.      Therefore, he is discharged in good and stable condition to home with close outpatient follow-up.    The patient met 2-midnight criteria for an inpatient stay at the time of discharge.    Discharge Date  11/28/2023    FOLLOW UP ITEMS POST DISCHARGE  -Follow-up with primary care provider in 1 week.  Follow-up for thrombophilia testing.  -Establish care and follow-up with a vascular neurologist.  Referral was placed prior to discharge.  -Establish care and follow-up with a hematologist.  Referral was placed prior to discharge.    DISCHARGE DIAGNOSES  Principal Problem:    Cerebral venous thrombosis (POA: Yes)  Active Problems:    Headache (POA: Yes)    Bell's palsy (POA: Yes)    Hyponatremia (POA: Yes)  Resolved Problems:    Nausea and vomiting (POA: Yes)      Overview: Nonbloody nonbilious vomiting likely secondary to cerebral venous       thrombosis as above      - As needed antiemetics      - Maintenance IV fluid    Hypokalemia (POA: Yes)      Overview: Potassium 3.5      - Replete potassium goal greater than 4.0      - Continue to trend on BMP    HTN (hypertension) (POA: Unknown)      FOLLOW UP  No future appointments.  [facility] NEUROLOGY  [address]  [facility] [address]  [phone]  Follow up      [facility] HEMATOLOGY/ONCOLOGY  [address]  [facility] [address]  [phone]  Follow up        MEDICATIONS ON DISCHARGE     Medication List        Start  taking these medications        Instructions   amLODIPine 10 MG Tabs  Start taking on: November 29, 2023  Commonly known as: Norvasc   Take 1 Tablet by mouth every day.  Dose: 10 mg     apixaban 5mg Tabs  Commonly known as: Eliquis   Take 1 Tablet by mouth 2 times a day. Indications: Thromboembolism secondary to Atrial Fibrillation  Dose: 5 mg     artificial tears Oint ophth ointment  Commonly known as: Eye Lubricant   Apply 1 Application to right eye every evening.  Dose: 1 Application     atorvastatin 20 MG Tabs  Commonly known as: Lipitor   Take 1 Tablet by mouth every evening.  Dose: 20 mg     * carboxymethylcellulose 0.5 % Soln  Commonly known as: Refresh Tears   Administer 1 Drop into both eyes 2 times a day as needed (Dry eyes).  Dose: 1 Drop     * carboxymethylcellulose 0.5 % Soln  Commonly known as: Refresh Tears   Administer 1 Drop into the right eye every 4 hours while awake.  Dose: 1 Drop     losartan 25 MG Tabs  Start taking on: November 29, 2023  Commonly known as: Cozaar   Take 1 Tablet by mouth every day.  Dose: 25 mg     oxyCODONE immediate-release 5 MG Tabs  Commonly known as: Roxicodone   Take 1 Tablet by mouth every 6 hours as needed for Severe Pain for up to 5 days.  Dose: 5 mg     predniSONE 20 MG Tabs  Commonly known as: Deltasone   Take 3 Tablets by mouth every day for 2 days.  Dose: 60 mg     valacyclovir 1 GM Tabs  Commonly known as: Valtrex   Take 1 Tablet by mouth 3 times a day for 2 days.  Dose: 1,000 mg      * This list has 2 medication(s) that are the same as other medications prescribed for you. Read the directions carefully, and ask your doctor or other care provider to review them with you.         Allergies  No Known Allergies    DIET  Orders Placed This Encounter   Procedures    Diet Order Diet: Regular     Standing Status:   Standing     Number of Occurrences:   1     Order Specific Question:   Diet:     Answer:   Regular [1]       ACTIVITY  As tolerated.  Weight bearing as  tolerated    CONSULTATIONS  Neurology    PROCEDURES  None    LABORATORY  Lab Results   Component Value Date    SODIUM 137 11/28/2023    POTASSIUM 4.2 11/28/2023    CHLORIDE 100 11/28/2023    CO2 28 11/28/2023    GLUCOSE 102 (H) 11/28/2023    BUN 15 11/28/2023    CREATININE 0.88 11/28/2023        Lab Results   Component Value Date    WBC 15.3 (H) 11/28/2023    HEMOGLOBIN 17.5 11/28/2023    HEMATOCRIT 49.9 11/28/2023    PLATELETCT 313 11/28/2023        Total time of the discharge process 42 minutes.

## 2023-11-28 NOTE — DISCHARGE INSTRUCTIONS
Barahona's Palsy, Adult    Bell's palsy is a short-term inability to move muscles in a part of the face. The inability to move, also called paralysis, results from inflammation or compression of the seventh cranial nerve. This nerve travels along the skull and under the ear to the side of the face. This nerve is responsible for facial movements that include blinking, closing the eyes, smiling, and frowning.  What are the causes?  The exact cause of this condition is not known. It may be caused by an infection from a virus, such as the chickenpox (herpes zoster), Wang-Barr, or mumps virus.  What increases the risk?  You are more likely to develop this condition if:  You are pregnant.  You have diabetes.  You have had a recent infection in your nose, throat, or airways.  You have a weakened body defense system (immune system).  You have had a facial injury, such as a fracture.  You have a family history of Bell's palsy.  What are the signs or symptoms?  Symptoms of this condition include:  Weakness on one side of the face.  Drooping eyelid and corner of the mouth.  Excessive tearing in one eye.  Difficulty closing the eyelid.  Dry eye.  Drooling.  Dry mouth.  Changes in taste.  Change in facial appearance.  Pain behind one ear.  Ringing in one or both ears.  Sensitivity to sound in one ear.  Facial twitching.  Headache.  Impaired speech.  Dizziness.  Difficulty eating or drinking.  Most of the time, only one side of the face is affected. In rare cases, Bell's palsy may affect the whole face.  How is this diagnosed?  This condition is diagnosed based on:  Your symptoms.  Your medical history.  A physical exam.  You may also have to see health care providers who specialize in disorders of the nerves (neurologist) or diseases and conditions of the eye (ophthalmologist). You may have tests, such as:  A test to check for nerve damage (electromyogram).  Imaging studies, such as a CT scan or an MRI.  Blood tests.  How is this  treated?  This condition affects every person differently. Sometimes symptoms go away without treatment within a couple weeks. If treatment is needed, it varies from person to person. The goal of treatment is to reduce inflammation and protect the eye from damage. Treatment for Bell's palsy may include:  Medicines, such as:  Steroids to reduce swelling and inflammation.  Antiviral medicines.  Pain relievers, including aspirin, acetaminophen, or ibuprofen.  Eye drops or ointment to keep your eye moist.  Eye protection, if you cannot close your eye.  Exercises or massage to regain muscle strength and function (physical therapy).  Follow these instructions at home:    Take over-the-counter and prescription medicines only as told by your health care provider.  If your eye is affected:  Keep your eye moist with eye drops or ointment as told by your health care provider.  Follow instructions for eye care and protection as told by your health care provider.  Do any physical therapy exercises as told by your health care provider.  Keep all follow-up visits. This is important.  Contact a health care provider if:  You have a fever or chills.  Your symptoms do not get better within 2-3 weeks, or your symptoms get worse.  Your eye is red, irritated, or painful.  You have new symptoms.  Get help right away if:  You have weakness or numbness in a part of your body other than your face.  You have trouble swallowing.  You develop neck pain or stiffness.  You develop dizziness or shortness of breath.  Summary  Bell's palsy is a short-term inability to move muscles in a part of the face. The inability to move results from inflammation or compression of the facial nerve.  This condition affects every person differently. Sometimes symptoms go away without treatment within a couple weeks.  If treatment is needed, it varies from person to person. The goal of treatment is to reduce inflammation and protect the eye from damage.  Contact  your health care provider if your symptoms do not get better within 2-3 weeks, or your symptoms get worse.  This information is not intended to replace advice given to you by your health care provider. Make sure you discuss any questions you have with your health care provider.  Document Revised: 09/16/2021 Document Reviewed: 09/16/2021  Elsevier Patient Education © 2023 [employer]